# Patient Record
Sex: MALE | Race: WHITE | NOT HISPANIC OR LATINO | Employment: OTHER | URBAN - METROPOLITAN AREA
[De-identification: names, ages, dates, MRNs, and addresses within clinical notes are randomized per-mention and may not be internally consistent; named-entity substitution may affect disease eponyms.]

---

## 2018-05-14 ENCOUNTER — OFFICE VISIT (OUTPATIENT)
Dept: FAMILY MEDICINE | Facility: CLINIC | Age: 38
End: 2018-05-14
Payer: COMMERCIAL

## 2018-05-14 VITALS
BODY MASS INDEX: 28.29 KG/M2 | SYSTOLIC BLOOD PRESSURE: 127 MMHG | HEIGHT: 69 IN | OXYGEN SATURATION: 97 % | RESPIRATION RATE: 20 BRPM | DIASTOLIC BLOOD PRESSURE: 69 MMHG | HEART RATE: 52 BPM | WEIGHT: 191 LBS | TEMPERATURE: 99.3 F

## 2018-05-14 DIAGNOSIS — R06.02 SOB (SHORTNESS OF BREATH): ICD-10-CM

## 2018-05-14 DIAGNOSIS — M70.22 OLECRANON BURSITIS OF LEFT ELBOW: Primary | ICD-10-CM

## 2018-05-14 DIAGNOSIS — R05.9 COUGH: ICD-10-CM

## 2018-05-14 PROCEDURE — 99214 OFFICE O/P EST MOD 30 MIN: CPT | Performed by: NURSE PRACTITIONER

## 2018-05-14 RX ORDER — ALBUTEROL SULFATE 90 UG/1
2 AEROSOL, METERED RESPIRATORY (INHALATION) EVERY 6 HOURS PRN
Qty: 1 INHALER | Refills: 0 | Status: SHIPPED | OUTPATIENT
Start: 2018-05-14 | End: 2019-03-15

## 2018-05-14 NOTE — PROGRESS NOTES
"  SUBJECTIVE:   Meño Daniels is a 37 year old male who presents to clinic today for the following health issues:      Musculoskeletal problem/pain      Duration: 1.5 months     Description  Location: left elbow     Intensity:  moderate    Accompanying signs and symptoms: swelling and fluid filled     History  Previous similar problem: YES  Previous evaluation:  none    Precipitating or alleviating factors:  Trauma or overuse: YES- hit elbow on ice from hockey   Aggravating factors include: continual impact to the site     Therapies tried and outcome: ice    Gets this once every hockey season.  Some resolve independently, some have been drained.      Also c/o SOB and nonproductive cough after he exercises.    Wife noticed every time he comes off the ice when he is playing hockey he coughs for a while.    Started paying attention to this and he notices this happens every time is is active.    Had chronic bronchitis as a kid and used an inhaler.    Mom smoked around him as a child and the inhaler helped.        Reviewed and updated as needed this visit by clinical staff  Tobacco  Allergies  Meds  Problems  Med Hx  Surg Hx  Fam Hx  Soc Hx        Reviewed and updated as needed this visit by Provider  Tobacco  Allergies  Meds  Problems  Med Hx  Surg Hx  Fam Hx  Soc Hx          ROS:  Constitutional, HEENT, cardiovascular, pulmonary, gi and gu systems are negative, except as otherwise noted.    OBJECTIVE:     /69 (BP Location: Left arm, Patient Position: Sitting, Cuff Size: Adult Regular)  Pulse 52  Temp 99.3  F (37.4  C) (Oral)  Resp 20  Ht 5' 9.25\" (1.759 m)  Wt 191 lb (86.6 kg)  SpO2 97%  BMI 28 kg/m2  Body mass index is 28 kg/(m^2).  GENERAL: healthy, alert and no distress  RESP: lungs clear to auscultation - no rales, rhonchi or wheezes  CV: regular rate and rhythm, normal S1 S2, no S3 or S4, no murmur, click or rub, no peripheral edema and peripheral pulses strong  MS: soft flesh toned 2 " cm fluctuant cyst on posterior olecranon  SKIN: no suspicious lesions or rashes      ASSESSMENT/PLAN:       ICD-10-CM    1. Olecranon bursitis of left elbow M70.22 ORTHO  REFERRAL   2. Cough R05 albuterol (PROAIR HFA/PROVENTIL HFA/VENTOLIN HFA) 108 (90 Base) MCG/ACT Inhaler   3. SOB (shortness of breath) R06.02 albuterol (PROAIR HFA/PROVENTIL HFA/VENTOLIN HFA) 108 (90 Base) MCG/ACT Inhaler     Recommend compression.  Given reoccurrence recommend f/u with orthopedics if unimproved.      Trial proair pre exercise.  F/u if persists or worsens.    The pathophysiology of asthma is explained. We've discussed the importance of compliance with medical regimen, and various treatment modalities such as beta agonists and inhaled steroids. The concepts of prophylactic and episodic or 'rescue' therapy has been discussed. The patient indicates understanding of these issues and knows when to call this office for help in treatment of asthma.    See Patient Instructions    EMA Moreira CNP  [facility]

## 2018-05-14 NOTE — MR AVS SNAPSHOT
After Visit Summary   5/14/2018    Meño Daniels    MRN: [id number]           Patient Information     Date Of Birth          1980        Visit Information        Provider Department      5/14/2018 1:00 PM Erendira Gómez APRN CNP [facility]        Today's Diagnoses     Olecranon bursitis of left elbow    -  1       Follow-ups after your visit        Additional Services     ORTHO  REFERRAL       [facility] is referring you to the Orthopedic  Services at [facility].       The  Representative will assist you in the coordination of your Orthopedic and Musculoskeletal Care as prescribed by your physician.    The  Representative will call you within 1 business day to help schedule your appointment, or you may contact the  Representative at:    All areas ~ [phone]     Type of Referral : Non Surgical       Timeframe requested: 3 - 5 days    Coverage of these services is subject to the terms and limitations of your health insurance plan.  Please call member services at your health plan with any benefit or coverage questions.      If X-rays, CT or MRI's have been performed, please contact the facility where they were done to arrange for , prior to your scheduled appointment.  Please bring this referral request to your appointment and present it to your specialist.                  Who to contact     If you have questions or need follow up information about today's clinic visit or your schedule please contact [facility] directly at [phone].  Normal or non-critical lab and imaging results will be communicated to you by MyChart, letter or phone within 4 business days after the clinic has received the results. If you do not hear from us within 7 days, please contact the clinic through MyChart or phone. If you have a critical or abnormal lab result, we will notify  "you by phone as soon as possible.  Submit refill requests through [email or web address] or call your pharmacy and they will forward the refill request to us. Please allow 3 business days for your refill to be completed.          Additional Information About Your Visit        [employer] Information     [email or web address] gives you secure access to your electronic health record. If you see a primary care provider, you can also send messages to your care team and make appointments. If you have questions, please call your primary care clinic.  If you do not have a primary care provider, please call [phone] and they will assist you.        Care EveryWhere ID     This is your Care EveryWhere ID. This could be used by other organizations to access your [facility] medical records  [id number]        Your Vitals Were     Pulse Temperature Respirations Height Pulse Oximetry BMI (Body Mass Index)    52 99.3  F (37.4  C) (Oral) 20 5' 9.25\" (1.759 m) 97% 28 kg/m2       Blood Pressure from Last 3 Encounters:   05/14/18 127/69   02/26/16 104/64   02/23/16 115/80    Weight from Last 3 Encounters:   05/14/18 191 lb (86.6 kg)   02/26/16 183 lb 3.2 oz (83.1 kg)   02/23/16 183 lb (83 kg)              We Performed the Following     ORTHO  REFERRAL        Primary Care Provider Office Phone # Fax #    Erendira Gómez, APRN [phone] [address] [facility] [address]        Equal Access to Services     ZACK DICKERSON : Hadii aad ku hadasho Soomaali, waaxda luqadaha, qaybta kaalmada adeegyada, waxay brandie gonsales . So [facility] [phone].    ATENCIÓN: Si habla español, tiene a rodriguez disposición servicios gratuitos de asistencia lingüística. Llame al [phone].    We comply with applicable federal civil rights laws and [place] laws. We do not discriminate on the basis of race, color, national origin, age, disability, sex, sexual orientation, or gender identity.            Thank you!     Thank you for " choosing [facility]  for your care. Our goal is always to provide you with excellent care. Hearing back from our patients is one way we can continue to improve our services. Please take a few minutes to complete the written survey that you may receive in the mail after your visit with us. Thank you!             Your Updated Medication List - Protect others around you: Learn how to safely use, store and throw away your medicines at www.disposemymeds.org.          This list is accurate as of 5/14/18  1:16 PM.  Always use your most recent med list.                   Brand Name Dispense Instructions for use Diagnosis    cholecalciferol 5000 units Caps     30 capsule         sulfamethoxazole-trimethoprim 800-160 MG per tablet    BACTRIM DS/SEPTRA DS    20 tablet    Take 1 tablet by mouth 2 times daily    Abscess of neck

## 2019-03-15 ENCOUNTER — TELEPHONE (OUTPATIENT)
Dept: FAMILY MEDICINE | Facility: CLINIC | Age: 39
End: 2019-03-15

## 2019-03-15 ENCOUNTER — NURSE TRIAGE (OUTPATIENT)
Dept: NURSING | Facility: CLINIC | Age: 39
End: 2019-03-15

## 2019-03-15 DIAGNOSIS — R06.02 SOB (SHORTNESS OF BREATH): ICD-10-CM

## 2019-03-15 DIAGNOSIS — R05.9 COUGH: ICD-10-CM

## 2019-03-15 DIAGNOSIS — J45.990 EXERCISE-INDUCED ASTHMA: Primary | ICD-10-CM

## 2019-03-15 RX ORDER — ALBUTEROL SULFATE 90 UG/1
2 AEROSOL, METERED RESPIRATORY (INHALATION) EVERY 6 HOURS PRN
Qty: 6.7 G | Refills: 0 | Status: SHIPPED | OUTPATIENT
Start: 2019-03-15 | End: 2019-12-09

## 2019-03-15 NOTE — TELEPHONE ENCOUNTER
Clinic Action Needed:  Yes, refill  FNA Triage Call  Presenting Problem:    Meño is calling to get a refill on Proair HFA/Proventil) Albuterol inhaler.  Please phone Meño with any questions.      Routed to:  RN Pool    Please be sure to close this encounter once this patient's issue/question has been addressed.    Michell Xie RN/[facility]

## 2019-03-15 NOTE — TELEPHONE ENCOUNTER
Meño is calling to get a refill on Proair HFA/Proventil) Albuterol inhaler.  Please phone Meño with any questions.

## 2019-03-15 NOTE — TELEPHONE ENCOUNTER
HP Reception please try to reach patient again today to make an appointment for further refills. Thank you. This would be needed by May.      Medication is being filled for 1 time refill only due to:  needs a yearly check in by 5/14/19.     Silvana Cole RN

## 2019-03-15 NOTE — TELEPHONE ENCOUNTER
Pt in agreement with plan and verbalized understanding. Will call back to schedule an appointment   Thanks!    Emani Garcia

## 2019-03-27 ENCOUNTER — OFFICE VISIT (OUTPATIENT)
Dept: FAMILY MEDICINE | Facility: CLINIC | Age: 39
End: 2019-03-27
Payer: COMMERCIAL

## 2019-03-27 VITALS
HEART RATE: 50 BPM | RESPIRATION RATE: 16 BRPM | WEIGHT: 189 LBS | SYSTOLIC BLOOD PRESSURE: 142 MMHG | OXYGEN SATURATION: 99 % | BODY MASS INDEX: 27.99 KG/M2 | DIASTOLIC BLOOD PRESSURE: 93 MMHG | TEMPERATURE: 97.8 F | HEIGHT: 69 IN

## 2019-03-27 DIAGNOSIS — Z13.220 SCREENING FOR LIPOID DISORDERS: ICD-10-CM

## 2019-03-27 DIAGNOSIS — M79.672 PAIN IN BOTH FEET: ICD-10-CM

## 2019-03-27 DIAGNOSIS — Z23 NEED FOR INFLUENZA VACCINATION: ICD-10-CM

## 2019-03-27 DIAGNOSIS — Z80.0 FAMILY HISTORY OF COLON CANCER: ICD-10-CM

## 2019-03-27 DIAGNOSIS — Z00.00 ROUTINE GENERAL MEDICAL EXAMINATION AT A HEALTH CARE FACILITY: Primary | ICD-10-CM

## 2019-03-27 DIAGNOSIS — M79.671 PAIN IN BOTH FEET: ICD-10-CM

## 2019-03-27 DIAGNOSIS — K64.4 EXTERNAL HEMORRHOIDS: ICD-10-CM

## 2019-03-27 LAB
ALBUMIN SERPL-MCNC: 4.5 G/DL (ref 3.3–4.6)
ALP SERPL-CCNC: 43 U/L (ref 40–150)
ALT SERPL-CCNC: 30 U/L (ref 0–70)
AST SERPL-CCNC: 37 U/L (ref 0–55)
BILIRUB SERPL-MCNC: 1.5 MG/DL (ref 0.2–1.3)
BUN SERPL-MCNC: 14 MG/DL (ref 5–24)
CALCIUM SERPL-MCNC: 9.7 MG/DL (ref 8.5–10.4)
CHLORIDE SERPLBLD-SCNC: 101 MMOL/L (ref 94–109)
CHOLEST SERPL-MCNC: 216 MG/DL (ref 0–200)
CHOLEST/HDLC SERPL: 2.6 {RATIO} (ref 0–5)
CO2 SERPL-SCNC: 34 MMOL/L (ref 20–32)
CREAT SERPL-MCNC: 1 MG/DL (ref 0.8–1.5)
EGFR CALCULATED (BLACK REFERENCE): 107.5
EGFR CALCULATED (NON BLACK REFERENCE): 88.9
FASTING SPECIMEN: NO
GLUCOSE SERPL-MCNC: 96 MG/DL (ref 60–109)
HDLC SERPL-MCNC: 82 MG/DL
LDLC SERPL CALC-MCNC: 120 MG/DL (ref 0–129)
POTASSIUM SERPL-SCNC: 4.5 MMOL/L (ref 3.4–5.3)
PROT SERPL-MCNC: 7.2 G/DL (ref 6.8–8.8)
SODIUM SERPL-SCNC: 144 MMOL/L (ref 136–145)
TRIGL SERPL-MCNC: 70 MG/DL (ref 0–150)
VLDL-CHOLESTEROL: 14 (ref 7–32)

## 2019-03-27 ASSESSMENT — MIFFLIN-ST. JEOR: SCORE: 1771.68

## 2019-03-27 NOTE — PROGRESS NOTES
REASON FOR VISIT: Establish care and annual physical    Meño Daniels is a 38 year old very active and physically fit male. He rides bicycles daily. He presents to [facility] and for an annual physical. He has the following concerns he would like to discuss:    Healthy diet  No soda  No major health problems    1. Blood per rectum and family history of colon cancer  He has been experiencing intermittent blood per rectum for about 10 years. He reports that this often coincides with other symptoms of hemorrhoids, such as rectal pain or itching. He has a family history of hemorrhoids in his father. He also has a family history of colon cancer in his paternal grandfather at age 80. He also notes that he typically has about 2 BMs per day, but often develops diarrhea. When this occurs, he can have up to 15 BMs per day. This makes his rectum raw, which he believes could also be a cause for the bleeding.    2. LEFT heel pain  He developed LEFT heel pain about 6 months ago. He attributes this to running with his dog, which makes him run faster than he normally would (mile in 6 min 40 sec). He stopped running with his dog, and the pain improved slightly but not completely. He reports that his LEFT heel is still very sore in the morning, and the pain improves after 15 minutes of walking around. The pain also recurs if he goes for a run, then sits for some time afterward. He has not done PT.    3. RIGHT toe pain  He was doing yoga during the winter of 2018, and went from a crescent lunge to a deeper crescent lunge. The next day, he woke up with a stabbing pain in his RIGHT first toe. The pain was located at the base of his first toe. The toe was also very unstable. He reports that he had severe pain in the toe for the entire day, which slowly improved. He occasionally still feels pain and instability when he steps on his RIGHT foot, especially while walking on ice. No pain currently.    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 for undergrad    Avid cyclist, bikes to work       FAMILY, MEDICAL AND PAST SURGICAL HISTORIES:  Unchanged.       MEDICATIONS:  Reviewed.       HEALTHCARE MAINTENANCE:    Dental: Due for dental visit  Immunizations: Declines flu vaccine. Up to date on other immunizations.  EtOH: 1/day most days, 2-3/day 2-3 days/week. No drinking in January.  Activity: 5-6 days per week, 1-3 hours. Cycling, XC skiing, hockey, yoga, running  STD screening: No concerns.    Health Maintenance   Topic Date Due     PREVENTIVE CARE VISIT  1980     ASTHMA ACTION PLAN Q1 YR  06/17/1985     ASTHMA CONTROL TEST Q6 MOS  06/17/1985     LIPID SCREEN Q5 YR MALE (SYSTEM ASSIGNED)  06/17/2015     PHQ-2 Q1 YR  03/27/2020     DTAP/TDAP/TD IMMUNIZATION (3 - Td) 03/08/2023     ZOSTER IMMUNIZATION (1 of 2) 06/17/2030     HIV SCREEN (SYSTEM ASSIGNED)  Completed     INFLUENZA VACCINE  Addressed     IPV IMMUNIZATION  Aged Out     MENINGITIS IMMUNIZATION  Aged Out         REVIEW OF SYSTEMS:  POSITIVE for blood per rectum, diarrhea, LEFT heel pain, and RIGHT first toe pain. Otherwise, 10 system review of systems is negative.       OBJECTIVE:    EXAM:  GENERAL: Alert, pleasant, NAD  VITAL SIGNS: BP (!) 142/93 (BP Location: Right arm, Patient Position: Sitting, Cuff Size: Adult Large)   Pulse 50   Temp 97.8  F (36.6  C) (Oral)   Resp 16   Ht 1.759 m (5' 9.25\")   Wt 85.7 kg (189 lb)   SpO2 99%   BMI 27.71 kg/m    EYES: PERRL, EOMI, conjunctiva clear  HENT: TM normal bilaterally. Nose and mouth without lesions.  NECK: No adenopathy, thyroid normal to palpation  RESP: Lungs clear to auscultation bilaterally  Axillae: No palpable axillary masses or adenopathy  CV: Regular rate and rhythm, normal S1 S2, No murmur, no carotid bruits  ABDOMEN: Soft, nontender, without HSM or masses. Bowel sounds normal   and Rectal - Deferred. Patient " had no concerns.   MS: Extremities normal- no gross deformities noted, no tender, hot or swollen joints. Full ROM in hips.  SKIN: No suspicious lesions or rashes  NEURO: Normal strength and tone, sensory exam grossly normal.  PSYCH: Mentation appears normal. Affect normal/bright.  EXT: No peripheral edema      RIGHT popliteal angle on hamstring flexion 150 degrees  LEFT popliteal angle on hamstring flexion about 145 degrees    LEFT foot  Observation: Normal  Palpation: No tenderness now, but at distal calcaneal border with present.  Range of motion:  Achilles flexion to 0 degrees with bent knee, -15 degrees with straight knee. 65 degrees of dorsiflexion.      Right foot  Observation: Some degree of pain (likely arthirtis) at first metatarsal phalangeal joint with limited dorsiflexion of the MTP joint.    Range of motion: Achilles flexion to 0 degrees with bent knee, -15 degrees with straight knee. 25 degrees of dorsiflexion.      LABORATORY DATA: Labs pending      ASSESSMENT:   Meño is a very physically active and fit 37yo who presents for his first visit to the [facility].     His concerns today are:  1. Intermittent hemorrhoids  2. Family history of colon cancer  3. LEFT heel pain that appears to be mild Plantar Fasciitis  3. RIGHT 1st toe pain that appears to likely be DJD of the MTP    On Physical exam, his achilles tendons and hamstrings have limited flexibility    P:  Check baseline labs  Referred to Colorectal surgery  Also, sent to Physical therapy. May just need a few visits  4. Due for dental visit    Follow up in 1 year or sooner as needed.       Call with any problems or questions.       I, Maria M Pineda, am serving as a scribe to document services personally performed by Cody Stephens MD based on data collection and the provider's statements to me. Dr. Stephens has reviewed, edited and approved the above note.     --Cody Stephens MD

## 2019-03-27 NOTE — NURSING NOTE
"38 year old  Chief Complaint   Patient presents with     Physical     Establish Care     Rectal Bleeding     fam hx of colon cancer in [facility]     Foot Pain     heel pain on left and turf toe on right      Abscess     on head       Blood pressure (!) 142/93, pulse 50, temperature 97.8  F (36.6  C), temperature source Oral, resp. rate 16, height 1.759 m (5' 9.25\"), weight 85.7 kg (189 lb), SpO2 99 %. Body mass index is 27.71 kg/m .  Patient Active Problem List   Diagnosis     CARDIOVASCULAR SCREENING; LDL GOAL LESS THAN 160       Wt Readings from Last 2 Encounters:   03/27/19 85.7 kg (189 lb)   05/14/18 86.6 kg (191 lb)     BP Readings from Last 3 Encounters:   03/27/19 (!) 142/93   05/14/18 127/69   02/26/16 104/64         Current Outpatient Medications   Medication     albuterol (PROAIR HFA/PROVENTIL HFA/VENTOLIN HFA) 108 (90 Base) MCG/ACT inhaler     cholecalciferol 5000 UNITS CAPS     sulfamethoxazole-trimethoprim (BACTRIM DS,SEPTRA DS) 800-160 MG per tablet     No current facility-administered medications for this visit.        Social History     Tobacco Use     Smoking status: Never Smoker     Smokeless tobacco: Never Used   Substance Use Topics     Alcohol use: Yes     Comment: social     Drug use: No       Health Maintenance Due   Topic Date Due     PREVENTIVE CARE VISIT  1980     ASTHMA ACTION PLAN Q1 YR  06/17/1985     ASTHMA CONTROL TEST Q6 MOS  06/17/1985     LIPID SCREEN Q5 YR MALE (SYSTEM ASSIGNED)  06/17/2015       No results found for: PAP      March 27, 2019 12:58 PM    "

## 2019-03-27 NOTE — PATIENT INSTRUCTIONS
Meño is a very physically active and fit 39yo who presents for his first visit to the [facility].     His concerns today are:  1. Intermittent hemorrhoids  2. Family history of colon cancer  3. LEFT heel pain that appears to be mild Plantar Fasciitis  3. RIGHT 1st toe pain that appears to likely be DJD of the MTP    On Physical exam, his achilles tendons and hamstrings have limited flexibility    P:  Check baseline labs  Referred to Colorectal surgery  Also, sent to Physical therapy. May just need a few visits  4. Due for dental visit    Follow up in 1 year or sooner as needed.       Preventive Health Recommendations  Male Ages 26 - 39    Yearly exam:             See your health care provider every year in order to  o   Review health changes.   o   Discuss preventive care.    o   Review your medicines if your doctor has prescribed any.    You should be tested each year for STDs (sexually transmitted diseases), if you re at risk.     After age 35, talk to your provider about cholesterol testing. If you are at risk for heart disease, have your cholesterol tested at least every 5 years.     If you are at risk for diabetes, you should have a diabetes test (fasting glucose).  Shots: Get a flu shot each year. Get a tetanus shot every 10 years.     Nutrition:    Eat at least 5 servings of fruits and vegetables daily.     Eat whole-grain bread, whole-wheat pasta and brown rice instead of white grains and rice.     Get adequate Calcium and Vitamin D.     Lifestyle    Exercise for at least 150 minutes a week (30 minutes a day, 5 days a week). This will help you control your weight and prevent disease.     Limit alcohol to one drink per day.     No smoking.     Wear sunscreen to prevent skin cancer.     See your dentist every six months for an exam and cleaning.

## 2019-03-27 NOTE — LETTER
[facility]  [address]  [place] [address]  Phone: [phone]  Fax: [phone]       Date: March 28, 2019      Meño Daniels  [address]   [place] [address]        Hi Meño,  Your labs appear in the healthy range.   Let me know if you have questions,  --Cody Stephens MD    Resulted Orders   Lipid Panel ([facility])   Result Value Ref Range    FASTING SPECIMEN no     Cholesterol 216.0 (H) 0.0 - 200.0    HDL Cholesterol 82.0 >40.0    Triglycerides 70.0 0.0 - 150.0    Cholesterol/HDL Ratio 2.6 0.0 - 5.0    LDL Cholesterol Direct 120.0 0.0 - 129.0    VLDL-Cholesterol 14.0 7.0 - 32.0   Comprehensive Metabolic Panel ([facility])   Result Value Ref Range    Glucose 96.0 60.0 - 109.0 mg/dL    Urea Nitrogen 14.0 5.0 - 24.0 mg/dL    Calcium 9.7 8.5 - 10.4 mg/dL    Creatinine 1.0 0.8 - 1.5 mg/dL    eGFR Calculated (Non Black Reference) 88.9 >60.0    eGFR Calculated (Black Reference) 107.5 >60.0    Sodium 144.0 136.0 - 145.0 mmol/L    Potassium 4.5 3.4 - 5.3 mmol/L    Chloride 101.0 94.0 - 109.0 mmol/L    Carbon Dioxide 34.0 (H) 20.0 - 32.0 mmol/L    Albumin 4.5 3.3 - 4.6 g/dL    Alkaline Phosphatase 43.0 40.0 - 150.0 U/L    ALT 30.0 0.0 - 70.0 U/L    AST 37.0 0.0 - 55.0 U/L    Bilirubin Total 1.5 (H) 0.2 - 1.3 mg/dL    Protein Total 7.2 6.8 - 8.8 g/dL

## 2019-03-29 ASSESSMENT — ASTHMA QUESTIONNAIRES: ACT_TOTALSCORE: 22

## 2019-04-01 ENCOUNTER — THERAPY VISIT (OUTPATIENT)
Dept: PHYSICAL THERAPY | Facility: CLINIC | Age: 39
End: 2019-04-01
Attending: FAMILY MEDICINE
Payer: COMMERCIAL

## 2019-04-01 DIAGNOSIS — M72.2 PLANTAR FASCIITIS OF LEFT FOOT: ICD-10-CM

## 2019-04-01 DIAGNOSIS — M79.671 PAIN IN BOTH FEET: ICD-10-CM

## 2019-04-01 DIAGNOSIS — M79.672 PAIN IN BOTH FEET: ICD-10-CM

## 2019-04-01 DIAGNOSIS — M79.674 PAIN OF TOE OF RIGHT FOOT: ICD-10-CM

## 2019-04-01 PROCEDURE — 97110 THERAPEUTIC EXERCISES: CPT | Mod: GP | Performed by: PHYSICAL THERAPIST

## 2019-04-01 PROCEDURE — 97161 PT EVAL LOW COMPLEX 20 MIN: CPT | Mod: GP | Performed by: PHYSICAL THERAPIST

## 2019-04-01 NOTE — PROGRESS NOTES
[facility] Initial Evaluation  Subjective:    Meño Daniels is a 38 year old male with a bilateral feet condition.  Condition occurred with:  Repetition/overuse.    This is a new condition  9/1/19 L heel during running with dog and yoga, started wearing Crocs and pain has improved with less morning pain. 2/14/19 R toe when stepping out of bed, performed cross country skiing without pain, then pain during lunge position with yoga..    Patient reports pain:  Great toe and medial calcaneal tuberosity.    Quality: L heel is ache, R toe is sharp. and is intermittent Pain Scale: L heel 7/10.   Pain is worse in the A.M. (L heel).  Symptoms are exacerbated by walking and standing (R toe with extension) Relieved by: L heel - supportive shoes, R toe - rest.  Since onset symptoms are gradually improving.        General health as reported by patient is excellent.  Pertinent medical history includes:  Asthma.  Medical allergies: no.  Other surgeries include:  None reported.    Current occupation is .            Red flags:  None as reported by patient.                        Objective:  System    Physical Exam    General     ROS  Gait: Patient demonstrates right antalgic gait with early heel rise and decreased toe off.  Observation:  Swelling/warmth Absent  Double leg stance- unremarkable  Single leg stance- unremarkable  WB dorsiflexion- R ankle = 4.5 inches, L ankle = 5 inches    Joint mobility:  Subtalar- sup/pron - decreased subtalar joint mobility on L>>R   Midtarsals- eversion/inversion, flex/ext, sup/pron - unremarkable    Ankle ROM: equal AROM side to side   Left Right   Dorsiflexion NA deg NA deg   Plantar flexion NA deg NA deg   Inversion NA deg NA deg   Eversion NA deg NA deg     Ankle Strength:   Left Right Pain?   Inversion + DF 5/5 5/5 none   Inversion + PF 5/5 5/5 none   Eversion + DF 5/5 5/5 none   Eversion + PF 5/5 5/5 none     Special Tests:  Anterior drawer: not done  Talar  Tilt: not done  External Rotation: not done    Assessment/Plan:    Patient is a 38 year old male with bilateral foot complaints.    Patient has the following significant findings with corresponding treatment plan.                Diagnosis 1:  L foot pain consistent with plantar fasciitis  Pain -  hot/cold therapy, US, manual therapy, self management, education and home program  Decreased joint mobility - manual therapy, therapeutic exercise and home program  Decreased strength - therapeutic exercise, therapeutic activities and home program  Decreased proprioception - neuro re-education, therapeutic activities and home program  Diagnosis 2:  R great toe pain consistent  Pain -  hot/cold therapy, US, manual therapy, splint/taping/bracing/orthotics, self management, education and home program  Decreased strength - therapeutic exercise, therapeutic activities and home program  Decreased proprioception - neuro re-education, therapeutic activities and home program    Therapy Evaluation Codes:   1) History comprised of:   Personal factors that impact the plan of care:      None.    Comorbidity factors that impact the plan of care are:      Asthma.     Medications impacting care: None.  2) Examination of Body Systems comprised of:   Body structures and functions that impact the plan of care:      Foot and Toe.   Activity limitations that impact the plan of care are:      Walking.  3) Clinical presentation characteristics are:   Stable/Uncomplicated.  4) Decision-Making    Low complexity using standardized patient assessment instrument and/or measureable assessment of functional outcome.  Cumulative Therapy Evaluation is: Low complexity.    Previous and current functional limitations:  (See Goal Flow Sheet for this information)    Short term and Long term goals: (See Goal Flow Sheet for this information)     Communication ability:  Patient appears to be able to clearly communicate and understand verbal and written  communication and follow directions correctly.  Treatment Explanation - The following has been discussed with the patient:   RX ordered/plan of care  Anticipated outcomes  Possible risks and side effects  This patient would benefit from PT intervention to resume normal activities.   Rehab potential is excellent.    Frequency:  1 X week, once daily  Duration:  for 8 weeks  Discharge Plan:  Achieve all LTG.  Independent in home treatment program.  Reach maximal therapeutic benefit.    Please refer to the daily flowsheet for treatment today, total treatment time and time spent performing 1:1 timed codes.

## 2019-04-11 ENCOUNTER — THERAPY VISIT (OUTPATIENT)
Dept: PHYSICAL THERAPY | Facility: CLINIC | Age: 39
End: 2019-04-11
Payer: COMMERCIAL

## 2019-04-11 DIAGNOSIS — M79.674 PAIN OF TOE OF RIGHT FOOT: ICD-10-CM

## 2019-04-11 DIAGNOSIS — M72.2 PLANTAR FASCIITIS OF LEFT FOOT: ICD-10-CM

## 2019-04-11 PROCEDURE — 97140 MANUAL THERAPY 1/> REGIONS: CPT | Mod: GP | Performed by: PHYSICAL THERAPIST

## 2019-04-11 PROCEDURE — 97530 THERAPEUTIC ACTIVITIES: CPT | Mod: GP | Performed by: PHYSICAL THERAPIST

## 2019-04-11 PROCEDURE — 97110 THERAPEUTIC EXERCISES: CPT | Mod: GP | Performed by: PHYSICAL THERAPIST

## 2019-04-18 ENCOUNTER — THERAPY VISIT (OUTPATIENT)
Dept: PHYSICAL THERAPY | Facility: CLINIC | Age: 39
End: 2019-04-18
Payer: COMMERCIAL

## 2019-04-18 DIAGNOSIS — M79.674 PAIN OF TOE OF RIGHT FOOT: ICD-10-CM

## 2019-04-18 DIAGNOSIS — M72.2 PLANTAR FASCIITIS OF LEFT FOOT: ICD-10-CM

## 2019-04-18 PROCEDURE — 97110 THERAPEUTIC EXERCISES: CPT | Mod: GP | Performed by: PHYSICAL THERAPIST

## 2019-04-18 PROCEDURE — 97035 APP MDLTY 1+ULTRASOUND EA 15: CPT | Mod: GP | Performed by: PHYSICAL THERAPIST

## 2019-04-18 PROCEDURE — 97140 MANUAL THERAPY 1/> REGIONS: CPT | Mod: GP | Performed by: PHYSICAL THERAPIST

## 2019-04-22 NOTE — TELEPHONE ENCOUNTER
FUTURE VISIT INFORMATION      FUTURE VISIT INFORMATION:    Date: 5/22/19    Time: 4:45PM    [place] [facility] [place]  REFERRAL INFORMATION:    Referring provider:  Dr. Cody Stephens    Referring providers clinic:  [facility]    Reason for visit/diagnosis:  Hemorrhoids and Family History of Colon Cancer     NOTES STATUS DETAILS   OFFICE NOTE from referring provider  Internal 3/27/19   OFFICE NOTE from other specialist   N/A    DISCHARGE SUMMARY FROM HOSPITAL N/A    DISCHARGE REPORT FROM ED N/A    OPERATIVE REPORT  N/A    PFC REPORT N/A    MEDICATION LIST Internal    LABS     FIT/STOOL TESTING N/A    ANAL PAP N/A    PATHOLOGY REPORTS RELATED TO DIAGNOSIS N/A    DIAGNOSTIC PROCEDURES     COLONOSCOPY N/A    ENDOSCOPY (EGD) N/A    ERCP N/A    ANOSCOPY N/A    FLEX SIGMOIDOSCOPY N/A    IMAGING & REPORT      CT, MRI, US, XR N/A

## 2019-05-08 ENCOUNTER — THERAPY VISIT (OUTPATIENT)
Dept: PHYSICAL THERAPY | Facility: CLINIC | Age: 39
End: 2019-05-08
Payer: COMMERCIAL

## 2019-05-08 DIAGNOSIS — M79.674 PAIN OF TOE OF RIGHT FOOT: ICD-10-CM

## 2019-05-08 DIAGNOSIS — M72.2 PLANTAR FASCIITIS OF LEFT FOOT: ICD-10-CM

## 2019-05-08 PROCEDURE — 97140 MANUAL THERAPY 1/> REGIONS: CPT | Mod: GP | Performed by: PHYSICAL THERAPIST

## 2019-05-08 PROCEDURE — 97530 THERAPEUTIC ACTIVITIES: CPT | Mod: GP | Performed by: PHYSICAL THERAPIST

## 2019-05-21 ENCOUNTER — TELEPHONE (OUTPATIENT)
Dept: SURGERY | Facility: CLINIC | Age: 39
End: 2019-05-21

## 2019-05-21 NOTE — TELEPHONE ENCOUNTER
LM for patient reminding him of his appt with Simran Kaye NP on 5/22/19 @ 4:45pm. Left direct number for pt to call back if unable to make appt.    Donya Damico LPN

## 2019-05-22 ENCOUNTER — OFFICE VISIT (OUTPATIENT)
Dept: SURGERY | Facility: CLINIC | Age: 39
End: 2019-05-22
Attending: FAMILY MEDICINE
Payer: COMMERCIAL

## 2019-05-22 ENCOUNTER — PRE VISIT (OUTPATIENT)
Dept: SURGERY | Facility: CLINIC | Age: 39
End: 2019-05-22

## 2019-05-22 VITALS
HEART RATE: 49 BPM | BODY MASS INDEX: 27.71 KG/M2 | OXYGEN SATURATION: 99 % | DIASTOLIC BLOOD PRESSURE: 82 MMHG | SYSTOLIC BLOOD PRESSURE: 139 MMHG | HEIGHT: 69 IN

## 2019-05-22 DIAGNOSIS — R19.7 DIARRHEA, UNSPECIFIED TYPE: ICD-10-CM

## 2019-05-22 DIAGNOSIS — K62.5 RECTAL BLEEDING: Primary | ICD-10-CM

## 2019-05-22 ASSESSMENT — ENCOUNTER SYMPTOMS
BOWEL INCONTINENCE: 0
ABDOMINAL PAIN: 0
BLOATING: 0
VOMITING: 0
NAUSEA: 0
HEARTBURN: 0
BLOOD IN STOOL: 0
CONSTIPATION: 0
RECTAL PAIN: 0
JAUNDICE: 0
DIARRHEA: 1

## 2019-05-22 ASSESSMENT — PAIN SCALES - GENERAL: PAINLEVEL: NO PAIN (0)

## 2019-05-22 NOTE — PROGRESS NOTES
"Colon and Rectal Surgery Consult Clinic Note    Date: 2019     Referring provider:  Cody Stephens MD  [address]  [place] [address]     RE: Meño Daniels  : 1980  SOFIE: 2019    Meño Daniels is a very pleasant 38 year old male without a significant past medical history with a recent diagnosis of diarrhea and rectal bleeding.  Given these findings they were subsequently sent to the Colon and Rectal Surgery Clinic for an opinion on this and a new patient consultation.     Meño reports long-standing history of intermittent diarrhea, rectal bleeding, and urgency with bowel movements.  He reports that he has diarrhea about 50% of the time.  On a bad day he will have 10-15 bowel movements a day with significant urgency.  He really only notices bleeding when he wipes multiple times after bowel movements and feels raw.  He believes the bleeding is only with wiping he has not seen a drip into the toilet bowl except maybe on one occasion a long time ago.  He is not on any blood thinners.  He denies any abdominal pain but does have increased flatus.  Family history significant for paternal grandfather with colon cancer in his late 70s.  Paternal grandmother with a bone cancer and his mother has a liver cancer that has metastasized to her bones.    Physical Examination: Exam was chaperoned by TIMOTHY Bragg  /82 (BP Location: Right arm, Patient Position: Sitting, Cuff Size: Adult Regular)   Pulse (!) 49   Ht 5' 9.25\"   SpO2 99%   BMI 27.71 kg/m    General: Alert, oriented, in no acute distress, sitting comfortably  HEENT: Mucous membranes moist  Perianal external examination:  Perianal skin: Intact with no excoriation or lichenification.  Lesions: No evidence of an external lesion, nodularity, or induration in the perianal region.  Eversion of buttocks: There was not evidence of an anal fissure. Details: N/A.  Skin tags or external hemorrhoids: " None.  Digital rectal examination: Was performed.   Sphincter tone: Good.  Palpable lesions: No.  Prostate: Normal.  Other: None.    Anoscopy: Was performed.   Hemorrhoids: Yes. Grade 1-2 internal hemorrhoids without active bleeding  Lesions: No    Assessment/Plan: 38 year old male with long-standing diarrhea and intermittent rectal bleeding.  No significant hemorrhoids on exam.  Discussed the bleeding could be related to small hemorrhoids or possibly perianal irritation with frequent diarrhea.  However, given the long-standing nature of his bleeding in addition to the diarrhea, would recommend a colonoscopy at this time.  We will then have him follow-up with gastroenterology for his diarrhea, if colonoscopy is normal.  In the meantime, recommend he try starting a daily fiber supplement such as Metamucil or Citrucel to bulk up his stools and see if this improves his stool consistency.  Encouraged him to contact the clinic in the meantime with any questions or concerns. Patient's questions were answered to his stated satisfaction and he is in agreement with this plan.    Medical history:  Past Medical History:   Diagnosis Date     NO ACTIVE PROBLEMS        Surgical history:  Past Surgical History:   Procedure Laterality Date     NO HISTORY OF SURGERY         Problem list:  Patient Active Problem List    Diagnosis Date Noted     CARDIOVASCULAR SCREENING; LDL GOAL LESS THAN 160 12/04/2014     Priority: Medium       Medications:  Current Outpatient Medications   Medication Sig Dispense Refill     albuterol (PROAIR HFA/PROVENTIL HFA/VENTOLIN HFA) 108 (90 Base) MCG/ACT inhaler Inhale 2 puffs into the lungs every 6 hours as needed for shortness of breath / dyspnea or wheezing 6.7 g 0     methylcellulose (CITRUCEL) powder Start with 1 heaping tablespoon. Increase as needed, 1 heaping tablespoon at a time, up to 3 times per day. 479 g 3     cholecalciferol 5000 UNITS CAPS  30 capsule        Allergies:  No Known  "Allergies    Family history:  Family History   Problem Relation Age of Onset     Colon Cancer Paternal Grandfather        Social history:  Social History     Tobacco Use     Smoking status: Never Smoker     Smokeless tobacco: Never Used   Substance Use Topics     Alcohol use: Yes     Comment: social    Marital status: single.  Occupation:  at [employer]     Nursing Notes:   Clive Luevano EMT  5/22/2019  4:41 PM  Signed  Chief Complaint   Patient presents with     Rectal Problem     Hemorrhoids and famly history of colon cancer.       Vitals:    05/22/19 1639   BP: 139/82   BP Location: Right arm   Patient Position: Sitting   Cuff Size: Adult Regular   Pulse: (!) 49   SpO2: 99%   Height: 5' 9.25\"       Body mass index is 27.71 kg/m .      TIMOTHY Bragg                       Total face to face time was 30 minutes, >50% counseling.    EMA Killian, NP-C  Colon and Rectal Surgery   [facility]    This note was created using speech recognition software and may contain unintended word substitutions.    "

## 2019-05-22 NOTE — LETTER
"2019       RE: Meño Daniels  [address]  [facility] [address]     Dear Colleague,    Thank you for referring your patient, Meño Daniels, to the [facility] COLON AND RECTAL SURGERY at [facility]. Please see a copy of my visit note below.    Colon and Rectal Surgery Consult Clinic Note    Date: 2019     Referring provider:  Cody Stephens MD  [address] [place] [address]     RE: Meño Daniels  : 1980  SOFIE: 2019    Meño Daniels is a very pleasant 38 year old male without a significant past medical history with a recent diagnosis of diarrhea and rectal bleeding.  Given these findings they were subsequently sent to the Colon and Rectal Surgery Clinic for an opinion on this and a new patient consultation.     Meño reports long-standing history of intermittent diarrhea, rectal bleeding, and urgency with bowel movements.  He reports that he has diarrhea about 50% of the time.  On a bad day he will have 10-15 bowel movements a day with significant urgency.  He really only notices bleeding when he wipes multiple times after bowel movements and feels raw.  He believes the bleeding is only with wiping he has not seen a drip into the toilet bowl except maybe on one occasion a long time ago.  He is not on any blood thinners.  He denies any abdominal pain but does have increased flatus.  Family history significant for paternal grandfather with colon cancer in his late 70s.  Paternal grandmother with a bone cancer and his mother has a liver cancer that has metastasized to her bones.    Physical Examination: Exam was chaperoned by Clive Luevano, EMT  /82 (BP Location: Right arm, Patient Position: Sitting, Cuff Size: Adult Regular)   Pulse (!) 49   Ht 5' 9.25\"   SpO2 99%   BMI 27.71 kg/m     General: Alert, oriented, in no acute distress, sitting comfortably  HEENT: Mucous membranes moist  Perianal " external examination:  Perianal skin: Intact with no excoriation or lichenification.  Lesions: No evidence of an external lesion, nodularity, or induration in the perianal region.  Eversion of buttocks: There was not evidence of an anal fissure. Details: N/A.  Skin tags or external hemorrhoids: None.  Digital rectal examination: Was performed.   Sphincter tone: Good.  Palpable lesions: No.  Prostate: Normal.  Other: None.    Anoscopy: Was performed.   Hemorrhoids: Yes. Grade 1-2 internal hemorrhoids without active bleeding  Lesions: No    Assessment/Plan: 38 year old male with long-standing diarrhea and intermittent rectal bleeding.  No significant hemorrhoids on exam.  Discussed the bleeding could be related to small hemorrhoids or possibly perianal irritation with frequent diarrhea.  However, given the long-standing nature of his bleeding in addition to the diarrhea, would recommend a colonoscopy at this time.  We will then have him follow-up with gastroenterology for his diarrhea, if colonoscopy is normal.  In the meantime, recommend he try starting a daily fiber supplement such as Metamucil or Citrucel to bulk up his stools and see if this improves his stool consistency.  Encouraged him to contact the clinic in the meantime with any questions or concerns. Patient's questions were answered to his stated satisfaction and he is in agreement with this plan.    Medical history:  Past Medical History:   Diagnosis Date     NO ACTIVE PROBLEMS        Surgical history:  Past Surgical History:   Procedure Laterality Date     NO HISTORY OF SURGERY         Problem list:  Patient Active Problem List    Diagnosis Date Noted     CARDIOVASCULAR SCREENING; LDL GOAL LESS THAN 160 12/04/2014     Priority: Medium       Medications:  Current Outpatient Medications   Medication Sig Dispense Refill     albuterol (PROAIR HFA/PROVENTIL HFA/VENTOLIN HFA) 108 (90 Base) MCG/ACT inhaler Inhale 2 puffs into the lungs every 6 hours as needed  "for shortness of breath / dyspnea or wheezing 6.7 g 0     methylcellulose (CITRUCEL) powder Start with 1 heaping tablespoon. Increase as needed, 1 heaping tablespoon at a time, up to 3 times per day. 479 g 3     cholecalciferol 5000 UNITS CAPS  30 capsule        Allergies:  No Known Allergies    Family history:  Family History   Problem Relation Age of Onset     Colon Cancer Paternal Grandfather        Social history:  Social History     Tobacco Use     Smoking status: Never Smoker     Smokeless tobacco: Never Used   Substance Use Topics     Alcohol use: Yes     Comment: social    Marital status: single.  Occupation:  at [employer]     Nursing Notes:   Clive Luevano EMT  5/22/2019  4:41 PM  Signed  Chief Complaint   Patient presents with     Rectal Problem     Hemorrhoids and famly history of colon cancer.       Vitals:    05/22/19 1639   BP: 139/82   BP Location: Right arm   Patient Position: Sitting   Cuff Size: Adult Regular   Pulse: (!) 49   SpO2: 99%   Height: 5' 9.25\"       Body mass index is 27.71 kg/m .      TIMOTHY Bragg        Total face to face time was 30 minutes, >50% counseling.    EMA Killian, NP-C  Colon and Rectal Surgery   [facility]    This note was created using speech recognition software and may contain unintended word substitutions.      "

## 2019-05-22 NOTE — NURSING NOTE
"Chief Complaint   Patient presents with     Rectal Problem     Hemorrhoids and famly history of colon cancer.       Vitals:    05/22/19 1639   BP: 139/82   BP Location: Right arm   Patient Position: Sitting   Cuff Size: Adult Regular   Pulse: (!) 49   SpO2: 99%   Height: 5' 9.25\"       Body mass index is 27.71 kg/m .      Clive Luevano, EMT                      "

## 2019-05-23 DIAGNOSIS — K62.5 RECTAL BLEEDING: Primary | ICD-10-CM

## 2019-05-29 ENCOUNTER — TELEPHONE (OUTPATIENT)
Dept: SURGERY | Facility: CLINIC | Age: 39
End: 2019-05-29

## 2019-05-31 ENCOUNTER — HOSPITAL ENCOUNTER (OUTPATIENT)
Facility: CLINIC | Age: 39
End: 2019-05-31
Attending: COLON & RECTAL SURGERY | Admitting: COLON & RECTAL SURGERY
Payer: COMMERCIAL

## 2019-05-31 NOTE — TELEPHONE ENCOUNTER
Patient is scheduled for surgery with Dr. Hackett      Spoke or left message with: Meño    Date of Surgery: 7/9/2019    Location: [place]    Informed patient they will need an adult  Yes    H&P: Scheduled with PAC    Surgery packet: Will mail

## 2019-06-10 ENCOUNTER — PRE VISIT (OUTPATIENT)
Dept: SURGERY | Facility: CLINIC | Age: 39
End: 2019-06-10

## 2019-06-10 NOTE — TELEPHONE ENCOUNTER
FUTURE VISIT INFORMATION      SURGERY INFORMATION:    Date: 19    Location: [facility] OR    Surgeon:  Stephan Hackett    Anesthesia Type:  MAC    RECORDS REQUESTED FROM:       Primary Care [facility]    Most recent EKG+ [id number]

## 2019-06-19 ENCOUNTER — ANESTHESIA EVENT (OUTPATIENT)
Dept: SURGERY | Facility: CLINIC | Age: 39
End: 2019-06-19

## 2019-06-19 ENCOUNTER — OFFICE VISIT (OUTPATIENT)
Dept: SURGERY | Facility: CLINIC | Age: 39
End: 2019-06-19
Payer: COMMERCIAL

## 2019-06-19 VITALS
HEART RATE: 52 BPM | DIASTOLIC BLOOD PRESSURE: 79 MMHG | HEIGHT: 70 IN | TEMPERATURE: 97.6 F | OXYGEN SATURATION: 98 % | WEIGHT: 185.9 LBS | SYSTOLIC BLOOD PRESSURE: 122 MMHG | RESPIRATION RATE: 14 BRPM | BODY MASS INDEX: 26.61 KG/M2

## 2019-06-19 DIAGNOSIS — Z01.818 PRE-OP EXAMINATION: Primary | ICD-10-CM

## 2019-06-19 ASSESSMENT — MIFFLIN-ST. JEOR: SCORE: 1764.49

## 2019-06-19 NOTE — H&P
Pre-Operative H & P     CC:  Preoperative exam to assess for increased cardiopulmonary risk while undergoing surgery and anesthesia.    Date of Encounter: 6/19/2019  Primary Care Physician:  Erendira Gómez     Reason for visit: pre operative examination, rectal bleeding    HPI  Meño Daniels is a 39 year old male who presents for pre-operative H & P in preparation for examination under anesthesia, colonoscopy with Dr. Hackett on 7/9/19 at [facility].     The patient is a 38 year old man who was seen for a routine health exam and discussed having rectal bleeding and diarrhea. He reports having diarrhea 50% of the time and can have up to 15 bowel movements with urgency. The patient sees bright red blood when he wipes multiple times and feels raw afterwards. Since being seen by the colorectal department he does report a decrease in the bleeding. He does have a family history of colon cancer in his paternal grandfather, paternal grandmother with bone cancer and his mother with liver cancer with metastasis to her bones. Given these symptoms and family history he is now scheduled for the procedure as above.     History is obtained from the patient and chart review    Past Medical History  Past Medical History:   Diagnosis Date     Asthma      Diarrhea      Plantar fasciitis      Rectal bleeding        Past Surgical History  Past Surgical History:   Procedure Laterality Date     wisdom teeth         Hx of Blood transfusions/reactions: none     Hx of abnormal bleeding or anti-platelet use: none    Menstrual history: No LMP for male patient.:     Steroid use in the last year: none    Personal or FH with difficulty with Anesthesia:  none    Prior to Admission Medications  Current Outpatient Medications   Medication Sig Dispense Refill     albuterol (PROAIR HFA/PROVENTIL HFA/VENTOLIN HFA) 108 (90 Base) MCG/ACT inhaler Inhale 2 puffs into the lungs every 6 hours as needed  for shortness of breath / dyspnea or wheezing (Patient taking differently: Inhale 2 puffs into the lungs as needed for shortness of breath / dyspnea or wheezing ) 6.7 g 0       Allergies  No Known Allergies    Social History  Social History     Socioeconomic History     Marital status: Single     Spouse name: Not on file     Number of children: 0     Years of education: Not on file     Highest education level: Not on file   Occupational History     Occupation: [employer]     Comment:    Social Needs     Financial resource strain: Not on file     Food insecurity:     Worry: Not on file     Inability: Not on file     Transportation needs:     Medical: Not on file     Non-medical: Not on file   Tobacco Use     Smoking status: Never Smoker     Smokeless tobacco: Never Used   Substance and Sexual Activity     Alcohol use: Yes     Comment: social     Drug use: No     Sexual activity: Yes     Partners: Female   Lifestyle     Physical activity:     Days per week: Not on file     Minutes per session: Not on file     Stress: Not on file   Relationships     Social connections:     Talks on phone: Not on file     Gets together: Not on file     Attends Ep[employer] service: Not on file     Active member of club or organization: Not on file     Attends meetings of clubs or organizations: Not on file     Relationship status: Not on file     Intimate partner violence:     Fear of current or ex partner: Not on file     Emotionally abused: Not on file     Physically abused: Not on file     Forced sexual activity: Not on file   Other Topics Concern     Parent/sibling w/ CABG, MI or angioplasty before 65F 55M? 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 .    Neurologic:  - neg neurologic ROS     Cardiovascular:  - neg cardiovascular ROS   (+) ----. : . . . :. . Previous cardiac testing date:results:date: results:ECG reviewed date:7/30/12 results:SB date: results:          METS/Exercise Tolerance: Comment: Patient bikes 5 times a week and at most up to 8 hours a time.  >4 METS   Hematologic:  - neg hematologic  ROS       Musculoskeletal:   (+)  other musculoskeletal- plantar fasciitis       GI/Hepatic:     (+) Other GI/Hepatic rectal bleeding, diarrhea       Renal/Genitourinary:  - ROS Renal section negative       Endo:  - neg endo ROS       Psychiatric:  - neg psychiatric ROS       Infectious Disease:  - neg infectious disease ROS       Malignancy:      - no malignancy   Other:    (+) no H/O Chronic Pain,no other significant disability        The complete review of systems is negative other than noted in the HPI or here.   Temp: 97.6  F (36.4  C) Temp src: Oral BP: 122/79 Pulse: 52   Resp: 14 SpO2: 98 %         185 lbs 14.4 oz  5' 10\"   Body mass index is 26.67 kg/m .       Physical Exam  Constitutional: Awake, alert, cooperative, no apparent distress, and appears stated age.  Eyes: Pupils equal, round and reactive to light, extra ocular muscles intact, sclera clear, conjunctiva normal.  HENT: Normocephalic, oral pharynx with moist mucus membranes, good dentition. No goiter appreciated.   Respiratory: Clear to auscultation bilaterally, no crackles or wheezing.  Cardiovascular: Regular rate and rhythm, normal S1 and S2, and no murmur noted.  Carotids +2, no bruits. No edema. Palpable pulses to radial  DP and PT arteries.   GI: Normal bowel sounds, soft, non-distended, non-tender, no masses palpated, no hepatosplenomegaly.    Lymph/Hematologic: No cervical " lymphadenopathy and no supraclavicular lymphadenopathy.  Genitourinary:  defer  Skin: Warm and dry.  No rashes at anticipated surgical site.   Musculoskeletal: Full ROM of neck. There is no redness, warmth, or swelling of the joints. Gross motor strength is normal.    Neurologic: Awake, alert, oriented to name, place and time. Cranial nerves II-XII are grossly intact. Gait is normal.   Neuropsychiatric: Calm, cooperative. Normal affect.     Labs: (personally reviewed)  Results for EKTA BETTENCOURT (MRN [id number]) as of 2019 07:49   Ref. Range 3/27/2019 13:43   Sodium Latest Ref Range: 136.0 - 145.0 mmol/L 144.0   Potassium Latest Ref Range: 3.4 - 5.3 mmol/L 4.5   Chloride Latest Ref Range: 94.0 - 109.0 mmol/L 101.0   Carbon Dioxide Latest Ref Range: 20.0 - 32.0 mmol/L 34.0 (H)   Urea Nitrogen Latest Ref Range: 5.0 - 24.0 mg/dL 14.0   Creatinine Latest Ref Range: 0.8 - 1.5 mg/dL 1.0   eGFR Calculated (Black Reference) Latest Ref Range: >60.0  107.5   eGFR Calculated (Non Black Reference) Latest Ref Range: >60.0  88.9   Calcium Latest Ref Range: 8.5 - 10.4 mg/dL 9.7   Albumin Latest Ref Range: 3.3 - 4.6 g/dL 4.5   Protein Total Latest Ref Range: 6.8 - 8.8 g/dL 7.2   Bilirubin Total Latest Ref Range: 0.2 - 1.3 mg/dL 1.5 (H)   Alkaline Phosphatase Latest Ref Range: 40.0 - 150.0 U/L 43.0   ALT Latest Ref Range: 0.0 - 70.0 U/L 30.0   AST Latest Ref Range: 0.0 - 55.0 U/L 37.0   Cholesterol Latest Ref Range: 0.0 - 200.0  216.0 (H)   Cholesterol/HDL Ratio Latest Ref Range: 0.0 - 5.0  2.6   Fasting Specimen Unknown no   HDL Cholesterol Latest Ref Range: >40.0  82.0   LDL Cholesterol Direct Latest Ref Range: 0.0 - 129.0  120.0   Triglycerides Latest Ref Range: 0.0 - 150.0  70.0   VLDL-Cholesterol Latest Ref Range: 7.0 - 32.0  14.0   Glucose Latest Ref Range: 60.0 - 109.0 mg/dL 96.0     EK12  Sinus bradycardia    The patient's records and results personally reviewed by this provider.     Outside records reviewed  from: care everywhere    ASSESSMENT and PLAN  Meño is a 39 year old man who is scheduled for examination under anesthesia and colonoscopy on 7/9/19 by Dr. Hackett in treatment of rectal bleeding.  PAC referral for risk assessment and optimization for anesthesia with comorbid conditions of asthma, diarrhea, plantar fasciitis:    Pre-operative considerations:  1.  Cardiac:  Functional status- METS >4.  Patient bikes 5 times a week. Low risk surgery with 0.9% (RCRI #) risk of major adverse cardiac event. No cardiac symptoms and excellent exercise tolerance. No further cardiac testing indicated.   2.  Pulm:  Airway feasible.  CRISTI risk: Low  ~ Exersize induced asthma - albuterol PRN  3. GI:  Risk of PONV score = 1.  If > 2, anti-emetic intervention recommended.  ~ Diarrhea/rectal bleeding/urgency - Patient reports the bleeding has improved but stool consistency stays the same. Procedure as above.   4. Musculoskeletal: Plantar fasciitis in left foot - ongoing physical therapy    VTE risk: 0.5%    Patient was discussed with Dr Isaacs.    The patient is optimized for their procedure. AVS with information on surgery time/arrival time, meds and NPO status given by nursing staff.        Radha Vitale PA-C  Preoperative Assessment Center  [facility]  Clinic and Surgery Center  Phone: [phone]  Fax: [phone]

## 2019-06-19 NOTE — ANESTHESIA PREPROCEDURE EVALUATION
Anesthesia Pre-Procedure Evaluation    Patient: Meño Daniels   MRN:     [id number] Gender:   male   Age:    39 year old :      1980        Preoperative Diagnosis: * No surgery found *        Past Medical History:   Diagnosis Date     Asthma      Diarrhea      Rectal bleeding       Past Surgical History:   Procedure Laterality Date     NO HISTORY OF SURGERY            Anesthesia Evaluation     . Pt has had prior anesthetic. Type of anesthetic: wisdom teeth.    No history of anesthetic complications          ROS/MED HX    ENT/Pulmonary:     (+)Intermittent asthma Treatment: Inhaler prn,  , . .    Neurologic:  - neg neurologic ROS     Cardiovascular:  - neg cardiovascular ROS   (+) ----. : . . . :. . Previous cardiac testing date:results:date: results:ECG reviewed date:12 results:SB date: results:          METS/Exercise Tolerance: Comment: Patient bikes 5 times a week and at most up to 8 hours a time.  >4 METS   Hematologic:  - neg hematologic  ROS       Musculoskeletal:   (+)  other musculoskeletal- plantar fasciitis       GI/Hepatic:     (+) Other GI/Hepatic rectal bleeding, diarrhea       Renal/Genitourinary:  - ROS Renal section negative       Endo:  - neg endo ROS       Psychiatric:  - neg psychiatric ROS       Infectious Disease:  - neg infectious disease ROS       Malignancy:      - no malignancy   Other:    (+) no H/O Chronic Pain,no other significant disability                        PHYSICAL EXAM:   Mental Status/Neuro: A/A/O; Age Appropriate   Airway: Facies: Feasible  Mallampati: I  Mouth/Opening: Full  TM distance: > 6 cm  Neck ROM: Full   Respiratory: Auscultation: CTAB     Resp. Rate: Normal     Resp. Effort: Normal      CV: Rhythm: Regular  Heart: Normal Sounds  Pulses: Normal   Comments:                    Lab Results   Component Value Date    WBC 8.7 2012    HGB 15.3 2012    HCT 45.7 2012     2012    .0 2019    POTASSIUM 4.5 2019     "CHLORIDE 101.0 03/27/2019    CO2 34.0 (H) 03/27/2019    BUN 14.0 03/27/2019    CR 1.0 03/27/2019    GLC 96.0 03/27/2019    EULA 9.7 03/27/2019    ALBUMIN 4.5 03/27/2019    PROTTOTAL 7.2 03/27/2019    ALT 30.0 03/27/2019    AST 37.0 03/27/2019    ALKPHOS 43.0 03/27/2019    BILITOTAL 1.5 (H) 03/27/2019    TSH 2.38 07/30/2012       Preop Vitals  BP Readings from Last 3 Encounters:   05/22/19 139/82   03/27/19 (!) 142/93   05/14/18 127/69    Pulse Readings from Last 3 Encounters:   05/22/19 (!) 49   03/27/19 50   05/14/18 52      Resp Readings from Last 3 Encounters:   03/27/19 16   05/14/18 20   02/26/16 18    SpO2 Readings from Last 3 Encounters:   05/22/19 99%   03/27/19 99%   05/14/18 97%      Temp Readings from Last 1 Encounters:   03/27/19 97.8  F (36.6  C) (Oral)    Ht Readings from Last 1 Encounters:   05/22/19 1.759 m (5' 9.25\")      Wt Readings from Last 1 Encounters:   03/27/19 85.7 kg (189 lb)    Estimated body mass index is 27.71 kg/m  as calculated from the following:    Height as of 5/22/19: 1.759 m (5' 9.25\").    Weight as of 3/27/19: 85.7 kg (189 lb).     LDA:            JSAVANNAHG FV AN PLAN NO PONV RULE         PAC Discussion and Assessment    ASA Classification: 2  Case is suitable for: [place]  Anesthetic techniques and relevant risks discussed: MAC with GA as backup  Invasive monitoring and risk discussed:   Types:   Possibility and Risk of blood transfusion discussed:   NPO instructions given:   Additional anesthetic preparation and risks discussed:   Needs early admission to pre-op area:   Other:     PAC Resident/NP Anesthesia Assessment:  Meño is a 39 year old man who is scheduled for examination under anesthesia and colonoscopy on 7/9/19 by Dr. Hackett in treatment of rectal bleeding.  PAC referral for risk assessment and optimization for anesthesia with comorbid conditions of asthma, diarrhea, plantar fasciitis:    Pre-operative considerations:  1.  Cardiac:  Functional status- METS >4.  Patient " bikes 5 times a week. Low risk surgery with 0.9% (RCRI #) risk of major adverse cardiac event. No cardiac symptoms and excellent exercise tolerance. No further cardiac testing indicated.   2.  Pulm:  Airway feasible.  CRISTI risk: Low  ~ Exersize induced asthma - albuterol PRN  3. GI:  Risk of PONV score = 1.  If > 2, anti-emetic intervention recommended.  ~ Diarrhea/rectal bleeding/urgency - Patient reports the bleeding has improved but stool consistency stays the same. Procedure as above.   4. Musculoskeletal: Plantar fasciitis in left foot - ongoing physical therapy    VTE risk: 0.5%    Patient is optimized and is acceptable candidate for the proposed procedure.  No further diagnostic evaluation is needed.     Patient discussed with Dr. Isaacs    For further details of assessment, testing, and physical exam please see H and P completed on same date.    Radha Vitale PA-C        Mid-Level Provider/Resident: Radha Vitale PA-C  Date: 6/19/19  Time:     Attending Anesthesiologist Anesthesia Assessment:        Anesthesiologist:   Date:   Time:   Pass/Fail:   Disposition:     PAC Pharmacist Assessment:        Pharmacist:   Date:   Time:        Radha Vtiale PA-C

## 2019-06-19 NOTE — PATIENT INSTRUCTIONS
Preparing for Your Surgery      Name:  Meño Daniels   MRN:  [id number]   :  1980   Today's Date:  2019     Arriving for surgery:  Surgery date:  2019  Arrival time:  9:00 am   Please come to:       [facility] [address]  [address] [place]  [address]    - ? parking is available in front of the hospital      -    Please proceed to [address] on the 3rd floor. [phone]?     - ?If you are in need of directions, wheelchair or escort please stop at the Information Desk in the lobby.  Inform the information person that you are here for surgery; a wheelchair and escort to [address] will be provided.?     What can I eat or drink?  - Follow directions for bowel prep from Dr Hackett's office       - you may have clear liquids until your arrival time at 9 am     Which medicines can I take?        Stop Aspirin, vitamins and supplements one week prior to surgery.      Hold Ibuprofen for 24 hours and/or Naproxen for 48 hours prior to surgery.     -  Please take these medications the day of surgery:     Inhalers as usual and bring to hospital       How do I prepare myself?  -  Take two showers: one the night before surgery; and one the morning of surgery.         Use Scrubcare or Hibiclens to wash from neck down.  You may use your own shampoo and conditioner. No other hair products.   -  Do NOT use lotion, powder, deodorant, or antiperspirant the day of your surgery.  -  Do NOT wear any makeup, fingernail polish or jewelry.  - Do not bring your own medications to the hospital, except for inhalers and eye   drops.  -  Bring your ID and insurance card.    Questions or Concerns:  -If you have questions or concerns regarding the day of surgery, please call   The Pre Admission Nursing Office at [phone].       -For questions after surgery please call your surgeons office.

## 2019-06-24 NOTE — DISCHARGE INSTRUCTIONS
Anorectal Surgery Instructions    What can I expect after anorectal surgery?  Most anorectal procedures are done as outpatient surgery, and you go home the same day as the procedure. A few surgical procedures will require that you stay in the hospital for about one to three days. No matter where the procedure is done or how long or short it takes, these recommendations will help you heal and feel more comfortable.    Medicines:  The anal area is very sensitive; you can expect to have some pain for up to 2-4 weeks after the procedure. Your doctor will give you a prescription for one or more pain medications.    Take naprosyn 500 mg twice a day OR ibuprofen 600 mg four times a day     Take this on a regular basis (not as needed) following your surgery.     The drugs are best taken with food.  Do not take if it causes stomach upset or if you have a history of ulcers or gastritis. You can stop the naprosyn (or ibuprofen) or reduce the dose when you are feeling better.    DO NOT use naprosyn, ibuprofen, or other similar agents (eg. Advil or Aleve) if you have inflammatory bowel disease (Ulcerative Colitis or Crohn's disease) or if your doctor as advised you against using these medications    Take acetominaphen (Tylenol) 650-1000 mg four times a day.     Take this on a regular basis (not as needed) following surgery for pain control.     Take the lower dose if you are >65 years old or have liver disease. The maximum dose of acetominaphen is 4000 mg a day. You can stop the acetaminophen or reduce the dose when you are feeling better.    It is important to realize that many narcotic pain relievers (including vicodin, percocet, tylenol #3) also have acetaminophen, and excessive doses of acetaminophen can be dangerous, so do not take these in addition to acetominaphen.  You may take narcotics that don't contain acetominaphen such as oxycodone.      Take oxycodone AS NEEDED in addition to the acetominaphen and naprosyn.       Because narcotics have side effects (including constipation), you should reduce your use of these medications as tolerated as your pain improves.    *In general, the best strategy is to take (if you are able to tolerate it) the tylenol and naprosen on a regular basis until your pain has largely gone away. You can take the narcotic pain medicine as needed in addition to the tylenol and ibuprofen. As your pain begins to lessen, you should cut back on your narcotic use while continuing to take your regular tylenol and naprosyn doses.      Refilling prescriptions. If you need additional pain medication, please call the triage nurse at [phone] during normal business hours (8 a.m. to 4 p.m., Monday though Friday) or have your pharmacy fax a refill request to [phone]. If you call after hours or on the weekends, the doctor on call may not know you personally and may not renew narcotic pain medication by phone. Call your primary care provider for all other medication refills.    Perineal care:  External gauze dressing can be removed the morning after surgery. If you have an adhesive dressing stuck to the incision, DO NOT remove this.   Tub baths:    If possible, take a tub bath immediately after each bowel movement.     Baths should be take at least 3 times daily for the first week to 10 days following your procedure. You should soak in the tub for 10 to 15 minutes each time with water as warm as you can tolerate.     Even after you go back to work, it is a good idea to sit in the tub in the morning, after returning from work, and again in the evening before bedtime.    Bleeding/Infection:    You can expect to have some bleeding after bowel movements, but it should stop soon after you wipe.     Use a wet cloth or perianal pad (Tucks or Preparation H pads) to gently wipe the area after each bowel movement.    Do not rub the anal area or use a lot of pressure.    Using a spray bottle filled with warm water helps  loosen any remaining stool. Blot gently with a soft dry cloth or tissue paper.    Infection around the anal opening is not very common. The anal area has excellent blood supply, which helps the area to heal. Bloody discharge after bowel movements is normal and may last 2 to 4 weeks after your surgery. However, if you bleed between bowel movements and cannot get it to stop, call the triage nurse immediately [phone].    Bowel function:  Take a fiber supplement such as Metamucil, which is over the counter. It is important to drink six to eight glasses of water or juice everyday when using fiber products.    If you do not have a bowel movement after 1-2 days:    Take Milk of Magnesia-2 tablespoons.       If there are no results, repeat this or add over the counter Miralax.      If you still do not have results, contact the clinic.     If there are no results, repeat this. Stop taking Milk of Magnesia or other laxatives if you begin to have diarrhea.    * Constipation will cause you to strain when you have a bowel movement. The hard stool will be difficult to pass, will increase pain and bleeding, and will slow down healing.  Try to avoid constipation and/or diarrhea as this can make the pain and bleeding worse.    * It is important to have regular bowel movements at least every other day and to keep your stool soft.  A high fiber diet, including at least four servings of fruits or vegetables daily, will help to keep your bowel movements regular and soft.    Activity:  After your procedure, there are no restrictions on your activity     except restrictions surrounding being on narcotics and in pain, such as no heavy machine operating or driving.     You may walk, climb stairs, ride in a car, and sit as tolerated.     It is helpful to avoid sitting in one position for long periods (2 or more hours).    After some surgeries, you may be told not to perform any lifting (more than 10 pounds) for several weeks after  surgery.    When to call:  When do I need to call the doctor or triage nurse?    If you experience any of the problems listed here, call our triage nurse during business hours ([phone]).     The nurse will help you with your problem or have the doctor call you.     After hours and on weekends, please call the main hospital number ([phone]) and ask for the colon and rectal surgery person on call.     Some is available to help you 24 hours a day, seven days a week.    Call for:   ? Fever greater than 101 degrees   ? Chills   ? Foul-smelling drainage   ? Nausea and vomiting   ? Diarrhea - greater than 3 water stools in 24 hours   ? Constipation - no bowel movement after 3 days   ? Severe bleeding that does not stop soon after a bowel movement   ? Problems with the incision, including increased pain, swelling, or redness

## 2019-07-03 ENCOUNTER — TELEPHONE (OUTPATIENT)
Dept: SURGERY | Facility: CLINIC | Age: 39
End: 2019-07-03

## 2019-07-03 NOTE — TELEPHONE ENCOUNTER
Left voicemail message for patient to call back due to his procedure on 7/9/19 is moving from [place] to the [facility] at the [facility] (per April E and Dr Hackett).

## 2019-07-03 NOTE — TELEPHONE ENCOUNTER
Spoke to patient, confirmed that his procedure on 7/9/19 has been moved to the [facility] at the [facility]. Also sent [email or web address] message. Patient expressed understanding.

## 2019-07-08 ENCOUNTER — ANESTHESIA EVENT (OUTPATIENT)
Dept: SURGERY | Facility: AMBULATORY SURGERY CENTER | Age: 39
End: 2019-07-08

## 2019-07-09 ENCOUNTER — ANESTHESIA (OUTPATIENT)
Dept: SURGERY | Facility: AMBULATORY SURGERY CENTER | Age: 39
End: 2019-07-09

## 2019-07-09 ENCOUNTER — HOSPITAL ENCOUNTER (OUTPATIENT)
Facility: AMBULATORY SURGERY CENTER | Age: 39
End: 2019-07-09
Attending: COLON & RECTAL SURGERY
Payer: COMMERCIAL

## 2019-07-09 VITALS
HEART RATE: 50 BPM | BODY MASS INDEX: 26.48 KG/M2 | WEIGHT: 185 LBS | RESPIRATION RATE: 16 BRPM | SYSTOLIC BLOOD PRESSURE: 115 MMHG | TEMPERATURE: 97.7 F | HEIGHT: 70 IN | OXYGEN SATURATION: 97 % | DIASTOLIC BLOOD PRESSURE: 67 MMHG

## 2019-07-09 RX ORDER — OXYCODONE HYDROCHLORIDE 5 MG/1
5-10 TABLET ORAL EVERY 4 HOURS PRN
Status: DISCONTINUED | OUTPATIENT
Start: 2019-07-09 | End: 2019-07-10 | Stop reason: HOSPADM

## 2019-07-09 RX ORDER — NALOXONE HYDROCHLORIDE 0.4 MG/ML
.1-.4 INJECTION, SOLUTION INTRAMUSCULAR; INTRAVENOUS; SUBCUTANEOUS
Status: DISCONTINUED | OUTPATIENT
Start: 2019-07-09 | End: 2019-07-10 | Stop reason: HOSPADM

## 2019-07-09 RX ORDER — ONDANSETRON 4 MG/1
4 TABLET, ORALLY DISINTEGRATING ORAL EVERY 30 MIN PRN
Status: DISCONTINUED | OUTPATIENT
Start: 2019-07-09 | End: 2019-07-10 | Stop reason: HOSPADM

## 2019-07-09 RX ORDER — ALBUTEROL SULFATE 0.83 MG/ML
2.5 SOLUTION RESPIRATORY (INHALATION) EVERY 4 HOURS PRN
Status: DISCONTINUED | OUTPATIENT
Start: 2019-07-09 | End: 2019-07-10 | Stop reason: HOSPADM

## 2019-07-09 RX ORDER — FENTANYL CITRATE 50 UG/ML
25-50 INJECTION, SOLUTION INTRAMUSCULAR; INTRAVENOUS
Status: DISCONTINUED | OUTPATIENT
Start: 2019-07-09 | End: 2019-07-10 | Stop reason: HOSPADM

## 2019-07-09 RX ORDER — SODIUM CHLORIDE, SODIUM LACTATE, POTASSIUM CHLORIDE, CALCIUM CHLORIDE 600; 310; 30; 20 MG/100ML; MG/100ML; MG/100ML; MG/100ML
INJECTION, SOLUTION INTRAVENOUS CONTINUOUS
Status: DISCONTINUED | OUTPATIENT
Start: 2019-07-09 | End: 2019-07-10 | Stop reason: HOSPADM

## 2019-07-09 RX ORDER — GABAPENTIN 300 MG/1
300 CAPSULE ORAL ONCE
Status: DISCONTINUED | OUTPATIENT
Start: 2019-07-09 | End: 2019-07-10 | Stop reason: HOSPADM

## 2019-07-09 RX ORDER — ACETAMINOPHEN 325 MG/1
975 TABLET ORAL ONCE
Status: DISCONTINUED | OUTPATIENT
Start: 2019-07-09 | End: 2019-07-10 | Stop reason: HOSPADM

## 2019-07-09 RX ORDER — MEPERIDINE HYDROCHLORIDE 25 MG/ML
12.5 INJECTION INTRAMUSCULAR; INTRAVENOUS; SUBCUTANEOUS
Status: DISCONTINUED | OUTPATIENT
Start: 2019-07-09 | End: 2019-07-10 | Stop reason: HOSPADM

## 2019-07-09 RX ORDER — PROPOFOL 10 MG/ML
INJECTION, EMULSION INTRAVENOUS CONTINUOUS PRN
Status: DISCONTINUED | OUTPATIENT
Start: 2019-07-09 | End: 2019-07-09

## 2019-07-09 RX ORDER — ONDANSETRON 2 MG/ML
4 INJECTION INTRAMUSCULAR; INTRAVENOUS EVERY 30 MIN PRN
Status: DISCONTINUED | OUTPATIENT
Start: 2019-07-09 | End: 2019-07-10 | Stop reason: HOSPADM

## 2019-07-09 RX ORDER — KETOROLAC TROMETHAMINE 30 MG/ML
30 INJECTION, SOLUTION INTRAMUSCULAR; INTRAVENOUS EVERY 6 HOURS PRN
Status: DISCONTINUED | OUTPATIENT
Start: 2019-07-09 | End: 2019-07-10 | Stop reason: HOSPADM

## 2019-07-09 RX ORDER — PROPOFOL 10 MG/ML
INJECTION, EMULSION INTRAVENOUS PRN
Status: DISCONTINUED | OUTPATIENT
Start: 2019-07-09 | End: 2019-07-09

## 2019-07-09 RX ORDER — LIDOCAINE 40 MG/G
CREAM TOPICAL
Status: DISCONTINUED | OUTPATIENT
Start: 2019-07-09 | End: 2019-07-10 | Stop reason: HOSPADM

## 2019-07-09 RX ORDER — DEXAMETHASONE SODIUM PHOSPHATE 4 MG/ML
4 INJECTION, SOLUTION INTRA-ARTICULAR; INTRALESIONAL; INTRAMUSCULAR; INTRAVENOUS; SOFT TISSUE EVERY 10 MIN PRN
Status: DISCONTINUED | OUTPATIENT
Start: 2019-07-09 | End: 2019-07-10 | Stop reason: HOSPADM

## 2019-07-09 RX ORDER — LIDOCAINE HYDROCHLORIDE 20 MG/ML
INJECTION, SOLUTION INFILTRATION; PERINEURAL PRN
Status: DISCONTINUED | OUTPATIENT
Start: 2019-07-09 | End: 2019-07-09

## 2019-07-09 RX ORDER — HYDROMORPHONE HYDROCHLORIDE 1 MG/ML
.3-.5 INJECTION, SOLUTION INTRAMUSCULAR; INTRAVENOUS; SUBCUTANEOUS EVERY 10 MIN PRN
Status: DISCONTINUED | OUTPATIENT
Start: 2019-07-09 | End: 2019-07-10 | Stop reason: HOSPADM

## 2019-07-09 RX ADMIN — SODIUM CHLORIDE, SODIUM LACTATE, POTASSIUM CHLORIDE, CALCIUM CHLORIDE: 600; 310; 30; 20 INJECTION, SOLUTION INTRAVENOUS at 11:45

## 2019-07-09 RX ADMIN — PROPOFOL 150 MCG/KG/MIN: 10 INJECTION, EMULSION INTRAVENOUS at 11:53

## 2019-07-09 RX ADMIN — LIDOCAINE HYDROCHLORIDE 80 MG: 20 INJECTION, SOLUTION INFILTRATION; PERINEURAL at 11:53

## 2019-07-09 RX ADMIN — PROPOFOL 100 MG: 10 INJECTION, EMULSION INTRAVENOUS at 11:57

## 2019-07-09 ASSESSMENT — MIFFLIN-ST. JEOR: SCORE: 1760.4

## 2019-07-09 NOTE — ANESTHESIA POSTPROCEDURE EVALUATION
Anesthesia POST Procedure Evaluation    Patient: Meño Daniels   MRN:     [id number] Gender:   male   Age:    39 year old :      1980        Preoperative Diagnosis: Rectal Bleeding   Procedure(s):  Colonoscopy with random biopsies   Postop Comments: No value filed.       Anesthesia Type:  MAC  No value filed.    Reportable Event: NO     PAIN: Uncomplicated   Sign Out status: Comfortable, Well controlled pain     PONV: No PONV   Sign Out status:  No Nausea or Vomiting     Neuro/Psych: Uneventful perioperative course   Sign Out Status: Preoperative baseline; Age appropriate mentation     Airway/Resp.: Uneventful perioperative course   Sign Out Status: Non labored breathing, age appropriate RR; Resp. Status within EXPECTED Parameters     CV: Uneventful perioperative course   Sign Out status: Appropriate BP and perfusion indices; Appropriate HR/Rhythm     Disposition:   Sign Out in:  PACU  Disposition:  Phase II; Home  Recovery Course: Uneventful  Follow-Up: Not required           Last Anesthesia Record Vitals:  CRNA VITALS  2019 1155 - 2019 1255      2019             Pulse:  54    Ht Rate:  54    SpO2:  96 %    Resp Rate (set):  10          Last PACU Vitals:  Vitals Value Taken Time   BP     Temp     Pulse     Resp     SpO2     Temp src     NIBP 103/64 2019 12:16 PM   Pulse 54 2019 12:24 PM   SpO2 96 % 2019 12:24 PM   Resp     Temp     Ht Rate 54 2019 12:24 PM   Temp 2           Electronically Signed By: Osmar Simon DO, 2019, 2:09 PM

## 2019-07-09 NOTE — ANESTHESIA PREPROCEDURE EVALUATION
Anesthesia Pre-Procedure Evaluation    Patient: Meño Daniels   MRN:     [id number] Gender:   male   Age:    39 year old :      1980        Preoperative Diagnosis: Rectal Bleeding   Procedure(s):  Colonoscopy     Past Medical History:   Diagnosis Date     Asthma      Diarrhea      Plantar fasciitis      Rectal bleeding       Past Surgical History:   Procedure Laterality Date     wisdom teeth            Anesthesia Evaluation     . Pt has had prior anesthetic. Type of anesthetic: wisdom teeth.    No history of anesthetic complications          ROS/MED HX    ENT/Pulmonary:     (+)Intermittent asthma Treatment: Inhaler prn,  , . .    Neurologic:  - neg neurologic ROS     Cardiovascular:  - neg cardiovascular ROS   (+) ----. : . . . :. . Previous cardiac testing date:results:date: results:ECG reviewed date:12 results:SB date: results:          METS/Exercise Tolerance: Comment: Patient bikes 5 times a week and at most up to 8 hours a time.  >4 METS   Hematologic:  - neg hematologic  ROS       Musculoskeletal:   (+)  other musculoskeletal- plantar fasciitis       GI/Hepatic:     (+) Other GI/Hepatic rectal bleeding, diarrhea       Renal/Genitourinary:  - ROS Renal section negative       Endo:  - neg endo ROS       Psychiatric:  - neg psychiatric ROS       Infectious Disease:  - neg infectious disease ROS       Malignancy:      - no malignancy   Other:    (+) no H/O Chronic Pain,no other significant disability                        PHYSICAL EXAM:   Mental Status/Neuro: A/A/O   Airway: Facies: Feasible  Mallampati: I  Mouth/Opening: Full  TM distance: > 6 cm  Neck ROM: Full   Respiratory: Auscultation: CTAB     Resp. Rate: Normal     Resp. Effort: Normal      CV: Rhythm: Regular  Rate: Age appropriate  Heart: Normal Sounds   Comments:      Dental: Normal                  Lab Results   Component Value Date    WBC 8.7 2012    HGB 15.3 2012    HCT 45.7 2012     2012    .0  "03/27/2019    POTASSIUM 4.5 03/27/2019    CHLORIDE 101.0 03/27/2019    CO2 34.0 (H) 03/27/2019    BUN 14.0 03/27/2019    CR 1.0 03/27/2019    GLC 96.0 03/27/2019    EULA 9.7 03/27/2019    ALBUMIN 4.5 03/27/2019    PROTTOTAL 7.2 03/27/2019    ALT 30.0 03/27/2019    AST 37.0 03/27/2019    ALKPHOS 43.0 03/27/2019    BILITOTAL 1.5 (H) 03/27/2019    TSH 2.38 07/30/2012       Preop Vitals  BP Readings from Last 3 Encounters:   06/19/19 122/79   05/22/19 139/82   03/27/19 (!) 142/93    Pulse Readings from Last 3 Encounters:   06/19/19 52   05/22/19 (!) 49   03/27/19 50      Resp Readings from Last 3 Encounters:   06/19/19 14   03/27/19 16   05/14/18 20    SpO2 Readings from Last 3 Encounters:   06/19/19 98%   05/22/19 99%   03/27/19 99%      Temp Readings from Last 1 Encounters:   06/19/19 36.4  C (97.6  F) (Oral)    Ht Readings from Last 1 Encounters:   06/19/19 1.778 m (5' 10\")      Wt Readings from Last 1 Encounters:   06/19/19 84.3 kg (185 lb 14.4 oz)    Estimated body mass index is 26.67 kg/m  as calculated from the following:    Height as of 6/19/19: 1.778 m (5' 10\").    Weight as of 6/19/19: 84.3 kg (185 lb 14.4 oz).     LDA:            Assessment:   ASA SCORE: 2    NPO Status: > 6 hours since completed Solid Foods   Documentation: H&P complete; Preop Testing complete; Consents complete   Proceeding: Proceed without further delay  Tobacco Use:  NO Active use of Tobacco/UNKNOWN Tobacco use status     Plan:   Anes. Type:  MAC   Pre-Induction: Midazolam IV   Induction:  IV (Standard)   Airway: Native Airway   Access/Monitoring: PIV   Maintenance: Propofol; IV   Emergence: Procedure Site   Logistics: Same Day Surgery     PONV Management:  Adult Risk Factors:, Non-Smoker  Prevention: Propofol Infusion     CONSENT: Direct conversation   Plan and risks discussed with: Patient   Blood Products: Consent Deferred (Minimal Blood Loss)                         Osmar Simon, DO  "

## 2019-07-09 NOTE — ANESTHESIA CARE TRANSFER NOTE
Patient: Meño Daniels    Procedure(s):  Colonoscopy with random biopsies    Diagnosis: Rectal Bleeding  Diagnosis Additional Information: No value filed.    Anesthesia Type:   No value filed.     Note:  Airway :Room Air  Patient transferred to:Phase II  Comments: VSS and WNL, comfortable, no PONV, report to Brandy RNHandoff Report: Identifed the Patient, Identified the Reponsible Provider, Reviewed the pertinent medical history, Discussed the surgical course, Reviewed Intra-OP anesthesia mangement and issues during anesthesia, Set expectations for post-procedure period and Allowed opportunity for questions and acknowledgement of understanding      Vitals: (Last set prior to Anesthesia Care Transfer)    CRNA VITALS  7/9/2019 1155 - 7/9/2019 1232      7/9/2019             Pulse:  54    Ht Rate:  54    SpO2:  96 %    Resp Rate (set):  10                Electronically Signed By: EMA Rodriguez CRNA  July 9, 2019  12:32 PM

## 2019-07-09 NOTE — DISCHARGE INSTRUCTIONS
.Discharge Instructions after Colonoscopy or Sigmoidoscopy       Today you had a Colonoscopy      Activity and Diet   You were given medicine for pain. You may be dizzy or sleepy.   For 24 hours:     Do not drive or use heavy equipment.     Do not make important decisions.     Do not drink any alcohol.   You may return to your normal diet and medicines.       Discomfort     Air was placed in your colon during the exam in order to see it. Walking helps to pass the air.     You may take Tylenol (acetaminophen) for pain unless your doctor has told you not to.       Follow-up   We took small tissue samples or polyps to study. Your doctor will call you with the results within two weeks.       When to call:       Call right away if you have:     Unusual pain in belly or chest pain not relieved with passing air.     More than 1 to 2 Tablespoons of bleeding from your rectum.     Fever above 100.6  F (37.5  C).       If you have severe pain, bleeding, or shortness of breath, go to an emergency room.       If you have questions, call:   Monday to Friday, 7 a.m. to 4:30 p.m.   Endoscopy: [phone] (We may have to call you back)       After hours   Hospital: [phone] (Ask for the GI fellow on call)

## 2019-07-09 NOTE — OP NOTE
"Procedure Date: 07/09/2019.      PREOPERATIVE DIAGNOSES:   1.  Hematochezia.   2.  Intermittent diarrhea.   3.  Family history of colorectal cancer in a paternal grandfather at age 80.      POSTOPERATIVE DIAGNOSES:   1.  Hematochezia.   2.  Intermittent diarrhea.   3.  Family history of colorectal cancer in a paternal grandfather at age 80.      ANESTHESIA:  MAC.      PROCEDURE PERFORMED:  Colonoscopy with CO2 insufflation and random biopsies.      SURGEON:  Stephan Hackett MD      ASSISTANT:  Yane Melo MD (Colon and Rectal Surgery Fellow)      DESCRIPTION OF PROCEDURE:  After obtaining informed consent, the patient was brought to the operating room and placed in the left lateral decubitus position.  MAC anesthesia was gently induced without difficulty.  The patient received appropriate mechanical DVT prophylaxis.  All pressure points were cushioned.  A \"time out\" was performed.  A pediatric colonoscope was then passed transanally and gently passed all the way to the cecum without difficulty.  The quality of the prep was good.  Identification of the appendiceal orifice and ileocecal valve was performed, and this was documented with endoscopic pictures.  The terminal ileum was intubated, and this appeared to be normal.  The colonoscope was then withdrawn from the cecum all the way to the rectum over the course of 10 minutes, and there was no evidence of inflammation, ulceration or neoplasia.  Random mucosal biopsies were taken with a cold biopsy forceps at the right and left colon, and these were sent for permanent pathology.  Retroflexion was performed in the rectum, and this revealed grade 1 internal hemorrhoids.  The patient's digital rectal exam showed no masses and good sphincter tone.  The colonoscope was then removed.  The patient tolerated the procedure well.      COMPLICATIONS:  None immediately.      ESTIMATED BLOOD LOSS:  None.      REPLACEMENT:  300 mL of crystalloid      SPECIMENS:  Random mucosal " biopsies of the right and left colon.      FINDINGS:  Normal colonoscopy.  Repeat colonoscopy in 10 years, but will need to await pathology results for final recommendation.      DISPOSITION:  PACU.         AMANUEL MARTINEZ MD             D: 2019   T: 2019   MT: autumn      Name:     EKTA BETTENCOURT   MRN:      [id number]        Account:        [id number]   :      1980           Procedure Date: 2019      Document: [id number]       cc: Simran Wood MSN, NP-C       Erendira Gómez APRN [facility] Billing

## 2019-07-09 NOTE — BRIEF OP NOTE
[facility]    Brief Operative Note    Pre-operative diagnosis: Rectal Bleeding  Post-operative diagnosis Colonoscopy w random biopsies  Procedure: Procedure(s):  Colonoscopy with random biopsies  Surgeon: Surgeon(s) and Role:     * Stephan Hackett MD - Primary     * Yane Melo MD - Assisting  Anesthesia: Monitor Anesthesia Care   Estimated blood loss: Minimal  Drains: None  Specimens:   ID Type Source Tests Collected by Time Destination   A : Right Colon Tissue Colon SURGICAL PATHOLOGY EXAM Stephan Hackett MD 7/9/2019 12:17 PM    B : Left Colon Tissue Colon SURGICAL PATHOLOGY EXAM Stephan Hackett MD 7/9/2019 12:19 PM      Findings:   normal appearing anatomy; 6 biopsies of each the right and left colon, send to pathology in separate containers.  Retroflex w mild grade I internal hemorrhoids..  Complications: None.  Implants:  * No implants in log *     [place] MiCROS Addendum    Operative time: 27    Is this an ERAS patient? no    Did the patient need a transfusion? no  If Yes, please list the type and amount of transfusion: na    Segment of colon removed: na    Type of anastomosis (i.e. stapled, hand-sewn): na    Was luminal betadine used? no    Hydropneumatic testing (positive or negative): no    Was the patient diverted? no  If yes, what type of fecal diversion? na    Hinchey class (if applicable): na

## 2019-07-10 LAB — COPATH REPORT: NORMAL

## 2019-07-11 ENCOUNTER — TELEPHONE (OUTPATIENT)
Dept: SURGERY | Facility: CLINIC | Age: 39
End: 2019-07-11

## 2019-07-11 NOTE — TELEPHONE ENCOUNTER
Called patient to set up f/u appt after his colonoscopy. No answer, LM for patient with direct number to call back to schedule.    Donya Damico LPN

## 2019-07-12 NOTE — TELEPHONE ENCOUNTER
Pt returned call and left voice message. Attempt to call again, went straight to . Left message again with direct number to call back.    Donya Damico LPN

## 2019-09-06 ENCOUNTER — TELEPHONE (OUTPATIENT)
Dept: GASTROENTEROLOGY | Facility: CLINIC | Age: 39
End: 2019-09-06

## 2019-09-10 ENCOUNTER — OFFICE VISIT (OUTPATIENT)
Dept: GASTROENTEROLOGY | Facility: CLINIC | Age: 39
End: 2019-09-10
Payer: COMMERCIAL

## 2019-09-10 VITALS
SYSTOLIC BLOOD PRESSURE: 134 MMHG | WEIGHT: 184.9 LBS | HEIGHT: 70 IN | HEART RATE: 47 BPM | BODY MASS INDEX: 26.47 KG/M2 | OXYGEN SATURATION: 96 % | DIASTOLIC BLOOD PRESSURE: 80 MMHG

## 2019-09-10 DIAGNOSIS — R13.10 DYSPHAGIA, UNSPECIFIED TYPE: ICD-10-CM

## 2019-09-10 DIAGNOSIS — R19.5 LOOSE STOOLS: Primary | ICD-10-CM

## 2019-09-10 ASSESSMENT — ENCOUNTER SYMPTOMS
ABDOMINAL PAIN: 0
BOWEL INCONTINENCE: 0
JAUNDICE: 0
CONSTIPATION: 0
RECTAL PAIN: 0
BLOATING: 0
NAUSEA: 0
VOMITING: 0
BLOOD IN STOOL: 0
HEARTBURN: 0
DIARRHEA: 1

## 2019-09-10 ASSESSMENT — MIFFLIN-ST. JEOR: SCORE: 1759.95

## 2019-09-10 NOTE — NURSING NOTE
"Chief Complaint   Patient presents with     New Patient     new consult       Vitals:    09/10/19 1158   BP: 134/80   Pulse: (!) 47   SpO2: 96%   Weight: 83.9 kg (184 lb 14.4 oz)   Height: 1.778 m (5' 10\")       Body mass index is 26.53 kg/m .    Tierra Damico CMA    "

## 2019-09-10 NOTE — LETTER
9/10/2019       RE: Meño Daniels  [address]  [address]  [place] [address]     Dear Colleague,    Thank you for referring your patient, Meño Daniels, to the [facility] GASTRO[facility]OLOG[facility] IBD CLINIC at [facility]. Please see a copy of my visit note below.    GI CLINIC VISIT    CC/REFERRING MD:  Simran Low  REASON FOR CONSULTATION: Diarrhea    ASSESSMENT/PLAN:  1. Diarrhea  Patient is a 39-year-old male with long-standing history of intermittent diarrhea, associated with urgency and occasional rectal bleeding.  Colonoscopy without evidence of inflammatory bowel disease or microscopic colitis.  We will plan to evaluate with TSH, celiac markers with upper endoscopy if indicated.  Symptoms may be due to irritable bowel syndrome with diarrhea, functional diarrhea, lactose intolerance or other food sensitivity.  Would recommend that patient does 2-week lactose-free trial.  If significant improvement in symptoms, would recommend avoiding lactose long-term or using Lactaid supplements as needed.  May also benefit from looking into foods from low FODMAP diet, can discuss this further with our dietitian.  The meantime, would recommend resuming fiber supplementation on a daily basis.  Would recommend starting low and increasing to effect.  Future considerations include treatment with Imodium or trial of rifaximin.  -- labs (TSH and celiac markers)  -- would recommend 2 week lactose-free trial. Can also look into low FODMAP diet. Could consider meeting with our dietitian   -- Soluble fiber supplementation on a daily basis can help regulate the consistency of your stools. Metamucil, citrucel or benefiber are all examples. You can start with 1 teaspoon per day and if tolerated, may increase up to 2 tablespoons per day. You may experience some bloating with initiation of fiber supplementation that will improve over the first month.  A good fiber trial to evaluate  the effect is 3-6 months.    2.  Rare dysphagia  Patient reports occasional dysphasia 1-2 times a year with history of food impaction in the early 2000s.  Will obtain records to see if patient had evidence of eosinophilic esophagitis.  Should continue good swallowing hygiene, symptoms are stable but will pursue upper endoscopy if dysphagia becomes more common.  -- will obtain records from upper endoscopy     Colorectal cancer screening: No evidence of adenomatous polyps in 2019, repeat at age 50 or sooner with change or worsening symptoms.    Return to GI Clinic pending response to above treatment     Thank you for this consultation.  It was a pleasure to participate in the care of this patient; please contact us with any further questions.     Gauri Shepherd PA-C  Division of Gastroenterology, Hepatology & Nutrition  [facility]  Meño Daniels is a 39 year old male referred from colorectal surgery for evaluation of intermittenet diarrhea, rectal bleeding, urgency with bowel movements.    After patient's visit with colorectal surgery, underwent colonoscopy 7/9/2019. TI appeared normal, no evidence of inflammation, ulceration or neoplasia, grade 1 internal hemorrhoids. Unremarkable random biopsies.    Today patient notes intermittent diarrhea associated with urgency starting in childhood.  Describes on a good day, he will have 2 bowel movements which are [place] scale 2-4 consistency.  On a bad day, he can have up to 10-12 bowel movements which are [place] scale 5-7, can be associated with bright red blood 2 to irritation.  States loose bowel movements are very urgent and she will have to immediately find the bathroom.  States a [employer] bowel movements are loose.  After seeing colorectal surgery, tried fiber supplementation for a couple weeks which did not seem to make a difference and he has discontinued.  Unclear what dose he was taking.  Notes associated abdominal discomfort with  "holding in bowel movement which improves with defecation.  Initially thought greasy foods trigger symptoms, now avoids cheese.  Also thought onions may contribute to increased gas occasionally.      Patient reports history of food impaction in the early 2000 and which had an inpatient upper endoscopy.  Was told he had a \"cat like \"esophagus.  Now reports on average he will have a sensation of food getting stuck 1-2 times a year typically with steak which is removed with coughing.    ROS:    No fevers or chills  No weight loss  No blurry vision, double vision or change in vision  No sore throat  No lymphadenopathy  No headache, paraesthesias, or weakness in a limb  No shortness of breath or wheezing  No chest pain or pressure  No arthralgias or myalgias  No rashes or skin changes  No odynophagia or dysphagia  No BRBPR, hematochezia, melena  + bright red blood   No dysuria, frequency or urgency  No hot/cold intolerance or polyria  No anxiety or depression    PROBLEM LIST  Patient Active Problem List    Diagnosis Date Noted     CARDIOVASCULAR SCREENING; LDL GOAL LESS THAN 160 12/04/2014     Priority: Medium       PERTINENT PAST MEDICAL HISTORY:  Past Medical History:   Diagnosis Date     Asthma      Diarrhea      Plantar fasciitis      Rectal bleeding        PREVIOUS SURGERIES:  Past Surgical History:   Procedure Laterality Date     COLONOSCOPY N/A 7/9/2019    Procedure: Colonoscopy with random biopsies;  Surgeon: Stephan Hackett MD;  Location: [facility] OR     wisdom teeth         PREVIOUS ENDOSCOPY:  Early 2000s     ALLERGIES:   No Known Allergies    PERTINENT MEDICATIONS:    Current Outpatient Medications:      albuterol (PROAIR HFA/PROVENTIL HFA/VENTOLIN HFA) 108 (90 Base) MCG/ACT inhaler, Inhale 2 puffs into the lungs every 6 hours as needed for shortness of breath / dyspnea or wheezing (Patient taking differently: Inhale 2 puffs into the lungs as needed for shortness of breath / dyspnea or wheezing ), Disp: " 6.7 g, Rfl: 0    SOCIAL HISTORY:  Occasional alcohol (wine with dinner)- has gotten headaches with wine and cut back, no drug use or smoking   Social History     Socioeconomic History     Marital status:      Spouse name: Not on file     Number of children: 0     Years of education: Not on file     Highest education level: Not on file   Occupational History     Occupation: [employer]     Comment:    Social Needs     Financial resource strain: Not on file     Food insecurity:     Worry: Not on file     Inability: Not on file     Transportation needs:     Medical: Not on file     Non-medical: Not on file   Tobacco Use     Smoking status: Never Smoker     Smokeless tobacco: Never Used   Substance and Sexual Activity     Alcohol use: Yes     Comment: social     Drug use: No     Sexual activity: Yes     Partners: Female   Lifestyle     Physical activity:     Days per week: Not on file     Minutes per session: Not on file     Stress: Not on file   Relationships     Social connections:     Talks on phone: Not on file     Gets together: Not on file     Attends Anabaptism service: Not on file     Active member of club or organization: Not on file     Attends meetings of clubs or organizations: Not on file     Relationship status: Not on file     Intimate partner violence:     Fear of current or ex partner: Not on file     Emotionally abused: Not on file     Physically abused: Not on file     Forced sexual activity: Not on file   Other Topics Concern     Parent/sibling w/ CABG, MI or angioplasty before 65F 55M? 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    Went to [facility] [employer] cycl[facility], bikes to work, wears a bike helmet       FAMILY HISTORY:  FH of CRC: paternal grandfather in old age (80s)  FH of IBD: none   Maternal grandmother with lactose intolerance  Family History   Problem Relation Age of Onset     Colon Cancer Paternal  "Grandfather      Cerebrovascular Disease Maternal Grandmother      Cardiovascular Maternal Grandfather         open heart surgery        Past/family/social history reviewed and no changes    PHYSICAL EXAMINATION:  Constitutional: aaox3, cooperative, pleasant, not dyspneic/diaphoretic, no acute distress  Vitals reviewed: /80   Pulse (!) 47   Ht 1.778 m (5' 10\")   Wt 83.9 kg (184 lb 14.4 oz)   SpO2 96%   BMI 26.53 kg/m     Wt:   Wt Readings from Last 2 Encounters:   09/10/19 83.9 kg (184 lb 14.4 oz)   08/21/19 84.1 kg (185 lb 8 oz)      Eyes: Sclera anicteric/injected  Ears/nose/mouth/throat: Normal oropharynx without ulcers or exudate, mucus membranes moist, hearing intact  Neck: supple, thyroid normal size  CV: No edema  Respiratory: Unlabored breathing  Lymph: No submandibular, supraclavicular or  lymphadenopathy  Abd: Nondistended, no hepatosplenomegaly, nontender, no peritoneal signs  Skin: warm, perfused, no jaundice  Psych: Normal affect  MSK: Normal gait    PERTINENT STUDIES:    Office Visit on 03/27/2019   Component Date Value Ref Range Status     FASTING SPECIMEN 03/27/2019 no   Final     Cholesterol 03/27/2019 216.0* 0.0 - 200.0 Final     HDL Cholesterol 03/27/2019 82.0  >40.0 Final     Triglycerides 03/27/2019 70.0  0.0 - 150.0 Final     Cholesterol/HDL Ratio 03/27/2019 2.6  0.0 - 5.0 Final     LDL Cholesterol Direct 03/27/2019 120.0  0.0 - 129.0 Final     VLDL-Cholesterol 03/27/2019 14.0  7.0 - 32.0 Final     Glucose 03/27/2019 96.0  60.0 - 109.0 mg/dL Final     Urea Nitrogen 03/27/2019 14.0  5.0 - 24.0 mg/dL Final     Calcium 03/27/2019 9.7  8.5 - 10.4 mg/dL Final     Creatinine 03/27/2019 1.0  0.8 - 1.5 mg/dL Final     eGFR Calculated (Non Black Referen* 03/27/2019 88.9  >60.0 Final     eGFR Calculated (Black Reference) 03/27/2019 107.5  >60.0 Final     Sodium 03/27/2019 144.0  136.0 - 145.0 mmol/L Final     Potassium 03/27/2019 4.5  3.4 - 5.3 mmol/L Final     Chloride 03/27/2019 101.0  94.0 " - 109.0 mmol/L Final     Carbon Dioxide 03/27/2019 34.0* 20.0 - 32.0 mmol/L Final     Albumin 03/27/2019 4.5  3.3 - 4.6 g/dL Final     Alkaline Phosphatase 03/27/2019 43.0  40.0 - 150.0 U/L Final     ALT 03/27/2019 30.0  0.0 - 70.0 U/L Final     AST 03/27/2019 37.0  0.0 - 55.0 U/L Final     Bilirubin Total 03/27/2019 1.5* 0.2 - 1.3 mg/dL Final     Protein Total 03/27/2019 7.2  6.8 - 8.8 g/dL Final       Again, thank you for allowing me to participate in the care of your patient.      Sincerely,    Gauri Shepherd PA-C

## 2019-09-10 NOTE — PROGRESS NOTES
GI CLINIC VISIT    CC/REFERRING MD:  Simran Johnson*  REASON FOR CONSULTATION: Diarrhea    ASSESSMENT/PLAN:  1. Diarrhea  Patient is a 39-year-old male with long-standing history of intermittent diarrhea, associated with urgency and occasional rectal bleeding.  Colonoscopy without evidence of inflammatory bowel disease or microscopic colitis.  We will plan to evaluate with TSH, celiac markers with upper endoscopy if indicated.  Symptoms may be due to irritable bowel syndrome with diarrhea, functional diarrhea, lactose intolerance or other food sensitivity.  Would recommend that patient does 2-week lactose-free trial.  If significant improvement in symptoms, would recommend avoiding lactose long-term or using Lactaid supplements as needed.  May also benefit from looking into foods from low FODMAP diet, can discuss this further with our dietitian.  The meantime, would recommend resuming fiber supplementation on a daily basis.  Would recommend starting low and increasing to effect.  Future considerations include treatment with Imodium or trial of rifaximin.  -- labs (TSH and celiac markers)  -- would recommend 2 week lactose-free trial. Can also look into low FODMAP diet. Could consider meeting with our dietitian   -- Soluble fiber supplementation on a daily basis can help regulate the consistency of your stools. Metamucil, citrucel or benefiber are all examples. You can start with 1 teaspoon per day and if tolerated, may increase up to 2 tablespoons per day. You may experience some bloating with initiation of fiber supplementation that will improve over the first month.  A good fiber trial to evaluate the effect is 3-6 months.    2.  Rare dysphagia  Patient reports occasional dysphasia 1-2 times a year with history of food impaction in the early 2000s.  Will obtain records to see if patient had evidence of eosinophilic esophagitis.  Should continue good swallowing hygiene, symptoms are stable but will pursue  upper endoscopy if dysphagia becomes more common.  -- will obtain records from upper endoscopy     Colorectal cancer screening: No evidence of adenomatous polyps in 2019, repeat at age 50 or sooner with change or worsening symptoms.    Return to GI Clinic pending response to above treatment     Thank you for this consultation.  It was a pleasure to participate in the care of this patient; please contact us with any further questions.     Gauri Shepherd PA-C  Division of Gastroenterology, Hepatology & Nutrition  [facility]  Meño Daniels is a 39 year old male referred from colorectal surgery for evaluation of intermittenet diarrhea, rectal bleeding, urgency with bowel movements.    After patient's visit with colorectal surgery, underwent colonoscopy 7/9/2019. TI appeared normal, no evidence of inflammation, ulceration or neoplasia, grade 1 internal hemorrhoids. Unremarkable random biopsies.    Today patient notes intermittent diarrhea associated with urgency starting in childhood.  Describes on a good day, he will have 2 bowel movements which are Henderson scale 2-4 consistency.  On a bad day, he can have up to 10-12 bowel movements which are Henderson scale 5-7, can be associated with bright red blood 2 to irritation.  States loose bowel movements are very urgent and she will have to immediately find the bathroom.  States a bout [employer] bowel movements are loose.  After seeing colorectal surgery, tried fiber supplementation for a couple weeks which did not seem to make a difference and he has discontinued.  Unclear what dose he was taking.  Notes associated abdominal discomfort with holding in bowel movement which improves with defecation.  Initially thought greasy foods trigger symptoms, now avoids cheese.  Also thought onions may contribute to increased gas occasionally.      Patient reports history of food impaction in the early 2000 and which had an inpatient upper endoscopy.  Was told  "he had a \"cat like \"esophagus.  Now reports on average he will have a sensation of food getting stuck 1-2 times a year typically with steak which is removed with coughing.    ROS:    No fevers or chills  No weight loss  No blurry vision, double vision or change in vision  No sore throat  No lymphadenopathy  No headache, paraesthesias, or weakness in a limb  No shortness of breath or wheezing  No chest pain or pressure  No arthralgias or myalgias  No rashes or skin changes  No odynophagia or dysphagia  No BRBPR, hematochezia, melena  + bright red blood   No dysuria, frequency or urgency  No hot/cold intolerance or polyria  No anxiety or depression    PROBLEM LIST  Patient Active Problem List    Diagnosis Date Noted     CARDIOVASCULAR SCREENING; LDL GOAL LESS THAN 160 12/04/2014     Priority: Medium       PERTINENT PAST MEDICAL HISTORY:  Past Medical History:   Diagnosis Date     Asthma      Diarrhea      Plantar fasciitis      Rectal bleeding        PREVIOUS SURGERIES:  Past Surgical History:   Procedure Laterality Date     COLONOSCOPY N/A 7/9/2019    Procedure: Colonoscopy with random biopsies;  Surgeon: Stephan Hackett MD;  Location: [facility] OR     wisdom teeth         PREVIOUS ENDOSCOPY:  Early 2000s     ALLERGIES:   No Known Allergies    PERTINENT MEDICATIONS:    Current Outpatient Medications:      albuterol (PROAIR HFA/PROVENTIL HFA/VENTOLIN HFA) 108 (90 Base) MCG/ACT inhaler, Inhale 2 puffs into the lungs every 6 hours as needed for shortness of breath / dyspnea or wheezing (Patient taking differently: Inhale 2 puffs into the lungs as needed for shortness of breath / dyspnea or wheezing ), Disp: 6.7 g, Rfl: 0    SOCIAL HISTORY:  Occasional alcohol (wine with dinner)- has gotten headaches with wine and cut back, no drug use or smoking   Social History     Socioeconomic History     Marital status:      Spouse name: Not on file     Number of children: 0     Years of education: Not on file     Highest " education level: Not on file   Occupational History     Occupation: [employer]     Comment:    Social Needs     Financial resource strain: Not on file     Food insecurity:     Worry: Not on file     Inability: Not on file     Transportation needs:     Medical: Not on file     Non-medical: Not on file   Tobacco Use     Smoking status: Never Smoker     Smokeless tobacco: Never Used   Substance and Sexual Activity     Alcohol use: Yes     Comment: social     Drug use: No     Sexual activity: Yes     Partners: Female   Lifestyle     Physical activity:     Days per week: Not on file     Minutes per session: Not on file     Stress: Not on file   Relationships     Social connections:     Talks on phone: Not on file     Gets together: Not on file     Attends [employer] service: Not on file     Active member of club or organization: Not on file     Attends meetings of clubs or organizations: Not on file     Relationship status: Not on file     Intimate partner violence:     Fear of current or ex partner: Not on file     Emotionally abused: Not on file     Physically abused: Not on file     Forced sexual activity: Not on file   Other Topics Concern     Parent/sibling w/ CABG, MI or angioplasty before 65F 55M? 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    Went to [facility] for [employer] cyclist, bikes to work, wears a bike helmet       FAMILY HISTORY:  FH of CRC: paternal grandfather in old age (80s)  FH of IBD: none   Maternal grandmother with lactose intolerance  Family History   Problem Relation Age of Onset     Colon Cancer Paternal Grandfather      Cerebrovascular Disease Maternal Grandmother      Cardiovascular Maternal Grandfather         open heart surgery        Past/family/social history reviewed and no changes    PHYSICAL EXAMINATION:  Constitutional: aaox3, cooperative, pleasant, not dyspneic/diaphoretic, no acute distress  Vitals  "reviewed: /80   Pulse (!) 47   Ht 1.778 m (5' 10\")   Wt 83.9 kg (184 lb 14.4 oz)   SpO2 96%   BMI 26.53 kg/m    Wt:   Wt Readings from Last 2 Encounters:   09/10/19 83.9 kg (184 lb 14.4 oz)   08/21/19 84.1 kg (185 lb 8 oz)      Eyes: Sclera anicteric/injected  Ears/nose/mouth/throat: Normal oropharynx without ulcers or exudate, mucus membranes moist, hearing intact  Neck: supple, thyroid normal size  CV: No edema  Respiratory: Unlabored breathing  Lymph: No submandibular, supraclavicular or  lymphadenopathy  Abd: Nondistended, no hepatosplenomegaly, nontender, no peritoneal signs  Skin: warm, perfused, no jaundice  Psych: Normal affect  MSK: Normal gait      PERTINENT STUDIES:    Office Visit on 03/27/2019   Component Date Value Ref Range Status     FASTING SPECIMEN 03/27/2019 no   Final     Cholesterol 03/27/2019 216.0* 0.0 - 200.0 Final     HDL Cholesterol 03/27/2019 82.0  >40.0 Final     Triglycerides 03/27/2019 70.0  0.0 - 150.0 Final     Cholesterol/HDL Ratio 03/27/2019 2.6  0.0 - 5.0 Final     LDL Cholesterol Direct 03/27/2019 120.0  0.0 - 129.0 Final     VLDL-Cholesterol 03/27/2019 14.0  7.0 - 32.0 Final     Glucose 03/27/2019 96.0  60.0 - 109.0 mg/dL Final     Urea Nitrogen 03/27/2019 14.0  5.0 - 24.0 mg/dL Final     Calcium 03/27/2019 9.7  8.5 - 10.4 mg/dL Final     Creatinine 03/27/2019 1.0  0.8 - 1.5 mg/dL Final     eGFR Calculated (Non Black Referen* 03/27/2019 88.9  >60.0 Final     eGFR Calculated (Black Reference) 03/27/2019 107.5  >60.0 Final     Sodium 03/27/2019 144.0  136.0 - 145.0 mmol/L Final     Potassium 03/27/2019 4.5  3.4 - 5.3 mmol/L Final     Chloride 03/27/2019 101.0  94.0 - 109.0 mmol/L Final     Carbon Dioxide 03/27/2019 34.0* 20.0 - 32.0 mmol/L Final     Albumin 03/27/2019 4.5  3.3 - 4.6 g/dL Final     Alkaline Phosphatase 03/27/2019 43.0  40.0 - 150.0 U/L Final     ALT 03/27/2019 30.0  0.0 - 70.0 U/L Final     AST 03/27/2019 37.0  0.0 - 55.0 U/L Final     Bilirubin Total " 03/27/2019 1.5* 0.2 - 1.3 mg/dL Final     Protein Total 03/27/2019 7.2  6.8 - 8.8 g/dL Final         Answers for HPI/ROS submitted by the patient on 9/10/2019   General Symptoms: No  Skin Symptoms: No  HENT Symptoms: No  EYE SYMPTOMS: No  HEART SYMPTOMS: No  LUNG SYMPTOMS: No  INTESTINAL SYMPTOMS: Yes  URINARY SYMPTOMS: No  REPRODUCTIVE SYMPTOMS: No  SKELETAL SYMPTOMS: No  BLOOD SYMPTOMS: No  NERVOUS SYSTEM SYMPTOMS: No  MENTAL HEALTH SYMPTOMS: No  Heart burn or indigestion: No  Nausea: No  Vomiting: No  Abdominal pain: No  Bloating: No  Constipation: No  Diarrhea: Yes  Blood in stool: No  Black stools: No  Rectal or Anal pain: No  Fecal incontinence: No  Yellowing of skin or eyes: No  Vomit with blood: No  Change in stools: No

## 2019-11-07 ENCOUNTER — HEALTH MAINTENANCE LETTER (OUTPATIENT)
Age: 39
End: 2019-11-07

## 2019-12-09 ENCOUNTER — OFFICE VISIT (OUTPATIENT)
Dept: FAMILY MEDICINE | Facility: CLINIC | Age: 39
End: 2019-12-09
Payer: COMMERCIAL

## 2019-12-09 VITALS
HEIGHT: 70 IN | HEART RATE: 59 BPM | DIASTOLIC BLOOD PRESSURE: 81 MMHG | BODY MASS INDEX: 27.77 KG/M2 | SYSTOLIC BLOOD PRESSURE: 126 MMHG | WEIGHT: 194 LBS | RESPIRATION RATE: 15 BRPM | TEMPERATURE: 97.6 F | OXYGEN SATURATION: 97 %

## 2019-12-09 DIAGNOSIS — R06.02 SOB (SHORTNESS OF BREATH): ICD-10-CM

## 2019-12-09 DIAGNOSIS — L72.9 SCALP CYST: Primary | ICD-10-CM

## 2019-12-09 DIAGNOSIS — R05.9 COUGH: ICD-10-CM

## 2019-12-09 RX ORDER — ALBUTEROL SULFATE 90 UG/1
2 AEROSOL, METERED RESPIRATORY (INHALATION) EVERY 6 HOURS PRN
Qty: 6.7 G | Refills: 0 | Status: SHIPPED | OUTPATIENT
Start: 2019-12-09

## 2019-12-09 RX ORDER — FAMOTIDINE 20 MG
TABLET ORAL
COMMUNITY

## 2019-12-09 ASSESSMENT — MIFFLIN-ST. JEOR: SCORE: 1801.23

## 2019-12-09 NOTE — NURSING NOTE
"39 year old  Chief Complaint   Patient presents with     Derm Problem     pt has a cyst at the top of his head he reports the bump is getting bigger     Asthma       Blood pressure 126/81, pulse 59, temperature 97.6  F (36.4  C), temperature source Oral, resp. rate 15, height 1.778 m (5' 10\"), weight 88 kg (194 lb), SpO2 97 %. Body mass index is 27.84 kg/m .  Patient Active Problem List   Diagnosis     CARDIOVASCULAR SCREENING; LDL GOAL LESS THAN 160       Wt Readings from Last 2 Encounters:   12/09/19 88 kg (194 lb)   09/10/19 83.9 kg (184 lb 14.4 oz)     BP Readings from Last 3 Encounters:   12/09/19 126/81   09/10/19 134/80   08/21/19 103/65         Current Outpatient Medications   Medication     Vitamin D, Cholecalciferol, 25 MCG (1000 UT) CAPS     albuterol (PROAIR HFA/PROVENTIL HFA/VENTOLIN HFA) 108 (90 Base) MCG/ACT inhaler     No current facility-administered medications for this visit.        Social History     Tobacco Use     Smoking status: Never Smoker     Smokeless tobacco: Never Used   Substance Use Topics     Alcohol use: Yes     Comment: social     Drug use: No       Health Maintenance Due   Topic Date Due     ASTHMA ACTION PLAN  1980     ASTHMA CONTROL TEST  09/27/2019       No results found for: PAP      December 9, 2019 8:23 AM  "

## 2019-12-09 NOTE — PROGRESS NOTES
REASON FOR VISIT:  Cysts on Scalp; Exercise Induced Asthma      HISTORY OF PRESENT ILLNESS: Meño Daniels is a 39 year old male who presents with 2 cysts on his scalp.  I have met Meño once in the past, on 03/27/2019 for a CPE.  He sent me a Shineon message on 12/2/2019, noting he would like to have these removed.  Today, he is asking this cyst be evaluated, as he has noticed these cysts become visible when he cuts his hair short.  These cysts have been present for over a year.  He has had similar cysts in the past, both on his shoulder and scalp, but has not had these removed or injected.  He has normally allowed these to resolve on their own.    Meño has a history of asthma, which is currently well managed with albuterol.  His asthma is triggered with exercise.  He is requesting refills.    ACT Total Scores 3/28/2019 12/9/2019   ACT TOTAL SCORE (Goal Greater than or Equal to 20) 22 23   In the past 12 months, how many times did you visit the emergency room for your asthma without being admitted to the hospital? 0 0   In the past 12 months, how many times were you hospitalized overnight because of your asthma? 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     Current Outpatient Medications   Medication Sig Dispense Refill     Vitamin D, Cholecalciferol, 25 MCG (1000 UT) CAPS        albuterol (PROAIR HFA/PROVENTIL HFA/VENTOLIN HFA) 108 (90 Base) MCG/ACT inhaler Inhale 2 puffs into the lungs every 6 hours as needed for shortness of breath / dyspnea or wheezing (Patient taking differently: Inhale 2 puffs into the lungs as needed for shortness of breath / dyspnea or wheezing ) 6.7 g 0       REVIEW OF SYSTEMS:  POSITIVE for cyst on scalp.  Previous LEFT heel pain, improved with rolling and PT.   "Previous RIGHT 1st toe pain, resolved with PT.  Otherwise, the ROS is negative.      OBJECTIVE:      EXAM: Meño is a 39 year old male, who appears to be physically active.  VITAL SIGNS: /81   Pulse 59   Temp 97.6  F (36.4  C) (Oral)   Resp 15   Ht 1.778 m (5' 10\")   Wt 88 kg (194 lb)   SpO2 97%   BMI 27.84 kg/m    Constitutional: healthy, alert and no distress   Scalp: Two mobile, rubbery cysts noted, 1 is about 1cm in diameter and to the Right of midline and on the cradle of scalp, the other is about 1.5 cm in diameter just to the LEFT of midline and overlying the posterior aspect of the frontal bone.         ASSESSMENT AND PLAN:   1. 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      Plan:  -Referred to Dermatology for evaluation and treatment.   -Return to clinic otherwise as needed.     2. Asthma: Stable. Only with exercise and only intermittently    -filled out asthma action plan  -Also, sent in new Rx for Albuteral    Call with any problems or questions.          I, Brian Power, am serving as a scribe to document services personally performed by Cody Stephens MD based on data collection and the provider's statements to me. Dr. Stephens has reviewed, edited and approved the above note.     --Cody Stephens MD  "

## 2019-12-09 NOTE — PATIENT INSTRUCTIONS
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     Plan:  -Referred to Dermatology for evaluation and treatment.   -Return to clinic otherwise as needed.

## 2019-12-09 NOTE — LETTER
My Asthma Action Plan    Name: Meño Daniels   YOB: 1980  Date: 12/9/2019   My doctor: Cody Stephens MD   My clinic: [facility]        My Rescue Medicine:   Albuterol inhaler (Proair/Ventolin/Proventil HFA)  2-4 puffs EVERY 4 HOURS as needed. Use a spacer if recommended by your provider.   My Asthma Severity:   Intermittent / Exercise Induced  Know your asthma triggers: exercise or sports             GREEN ZONE   Good Control    I feel good    No cough or wheeze    Can work, sleep and play without asthma symptoms       Take your asthma control medicine every day.     1. If exercise triggers your asthma, take your rescue medication    15 minutes before exercise or sports, and    During exercise if you have asthma symptoms  2. Spacer to use with inhaler: If you have a spacer, make sure to use it with your inhaler             YELLOW ZONE Getting Worse  I have ANY of these:    I do not feel good    Cough or wheeze    Chest feels tight    Wake up at night   1. Keep taking your Green Zone medications  2. Start taking your rescue medicine:    every 20 minutes for up to 1 hour. Then every 4 hours for 24-48 hours.  3. If you stay in the Yellow Zone for more than 12-24 hours, contact your doctor.  4. If you do not return to the Green Zone in 12-24 hours or you get worse, start taking your oral steroid medicine if prescribed by your provider.           RED ZONE Medical Alert - Get Help  I have ANY of these:    I feel awful    Medicine is not helping    Breathing getting harder    Trouble walking or talking    Nose opens wide to breathe       1. Take your rescue medicine NOW  2. If your provider has prescribed an oral steroid medicine, start taking it NOW  3. Call your doctor NOW  4. If you are still in the Red Zone after 20 minutes and you have not reached your doctor:    Take your rescue medicine again and    Call 911 or go to the emergency room right away    See your regular doctor within 2  weeks of an Emergency Room or Urgent Care visit for follow-up treatment.          Annual Reminders:  Meet with Asthma Educator,  Flu Shot in the Fall, consider Pneumonia Vaccination for patients with asthma (aged 19 and older).    Pharmacy: [facility] #[id number]    Electronically signed by Cody Stephens MD   Date: 12/09/19                    Asthma Triggers  How To Control Things That Make Your Asthma Worse    Triggers are things that make your asthma worse.  Look at the list below to help you find your triggers and   what you can do about them. You can help prevent asthma flare-ups by staying away from your triggers.      Trigger                                                          What you can do   Cigarette Smoke  Tobacco smoke can make asthma worse. Do not allow smoking in your home, car or around you.  Be sure no one smokes at a child s day care or school.  If you smoke, ask your health care provider for ways to help you quit.  Ask family members to quit too.  Ask your health care provider for a referral to Quit Plan to help you quit smoking, or call 6-287-597-PLAN.     Colds, Flu, Bronchitis  These are common triggers of asthma. Wash your hands often.  Don t touch your eyes, nose or mouth.  Get a flu shot every year.     Dust Mites  These are tiny bugs that live in cloth or carpet. They are too small to see. Wash sheets and blankets in hot water every week.   Encase pillows and mattress in dust mite proof covers.  Avoid having carpet if you can. If you have carpet, vacuum weekly.   Use a dust mask and HEPA vacuum.   Pollen and Outdoor Mold  Some people are allergic to trees, grass, or weed pollen, or molds. Try to keep your windows closed.  Limit time out doors when pollen count is high.   Ask you health care provider about taking medicine during allergy season.     Animal Dander  Some people are allergic to skin flakes, urine or saliva from pets with fur or feathers. Keep pets with fur or feathers  out of your home.    If you can t keep the pet outdoors, then keep the pet out of your bedroom.  Keep the bedroom door closed.  Keep pets off cloth furniture and away from stuffed toys.     Mice, Rats, and Cockroaches  Some people are allergic to the waste from these pests.   Cover food and garbage.  Clean up spills and food crumbs.  Store grease in the refrigerator.   Keep food out of the bedroom.   Indoor Mold  This can be a trigger if your home has high moisture. Fix leaking faucets, pipes, or other sources of water.   Clean moldy surfaces.  Dehumidify basement if it is damp and smelly.   Smoke, Strong Odors, and Sprays  These can reduce air quality. Stay away from strong odors and sprays, such as perfume, powder, hair spray, paints, smoke incense, paint, cleaning products, candles and new carpet.   Exercise or Sports  Some people with asthma have this trigger. Be active!  Ask your doctor about taking medicine before sports or exercise to prevent symptoms.    Warm up for 5-10 minutes before and after sports or exercise.     Other Triggers of Asthma  Cold air:  Cover your nose and mouth with a scarf.  Sometimes laughing or crying can be a trigger.  Some medicines and food can trigger asthma.

## 2019-12-10 ASSESSMENT — ASTHMA QUESTIONNAIRES: ACT_TOTALSCORE: 23

## 2020-01-22 ENCOUNTER — OFFICE VISIT (OUTPATIENT)
Dept: DERMATOLOGY | Facility: CLINIC | Age: 40
End: 2020-01-22
Payer: COMMERCIAL

## 2020-01-22 VITALS — DIASTOLIC BLOOD PRESSURE: 69 MMHG | HEART RATE: 50 BPM | SYSTOLIC BLOOD PRESSURE: 120 MMHG

## 2020-01-22 DIAGNOSIS — L72.11 PILAR CYSTS: Primary | ICD-10-CM

## 2020-01-22 ASSESSMENT — PAIN SCALES - GENERAL: PAINLEVEL: NO PAIN (0)

## 2020-01-22 NOTE — PROGRESS NOTES
[facility] Dermatology Note      Dermatology Problem List:  1. 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 Meño Daniels is a 39 year old male who presents for evaluation of cysts. He reports that he has a couple raised lesions on his scalp which have been present for as long as he can remember. About ten years ago, one of these began growing in size, and it has now gotten large enough to cause him discomfort when he gets his hair cut. He had a similar lesion on his right shoulder which he states ruptured when he was playing hockey and was subsequently removed. He also notes that he has a similar-appearing raised lesion on his posterior neck which developed about 3 years ago. The patient voices no other concerns.    Past Medical History:   Patient Active Problem List   Diagnosis     CARDIOVASCULAR SCREENING; LDL GOAL LESS THAN 160     Past Medical History:   Diagnosis Date     Asthma      Diarrhea      Plantar fasciitis      Rectal bleeding      Past Surgical History:   Procedure Laterality Date     COLONOSCOPY N/A 7/9/2019    Procedure: Colonoscopy with random biopsies;  Surgeon: Stephan Hackett MD;  [facility]: [facility]     wisdom teeth         Social History:  Patient reports that he has never smoked. He has never used smokeless tobacco. He reports current alcohol use. He reports that he does not use drugs.    Family History:  Family History   Problem Relation Age of Onset     Colon Cancer Paternal Grandfather      Cerebrovascular Disease Maternal Grandmother      Cardiovascular Maternal Grandfather         open heart surgery        Medications:  Current Outpatient Medications   Medication Sig Dispense Refill     albuterol (PROAIR HFA/PROVENTIL HFA/VENTOLIN HFA) 108 (90 Base) MCG/ACT inhaler  Inhale 2 puffs into the lungs every 6 hours as needed for shortness of breath / dyspnea or wheezing 6.7 g 0     Vitamin D, Cholecalciferol, 25 MCG (1000 UT) CAPS          No Known Allergies    Review of Systems:  -Constitutional: Otherwise feeling well today, in usual state of health.  -HEENT: Patient denies nonhealing oral sores.  -Skin: As above in HPI. No additional skin concerns.    Physical exam:  Vitals: /69 (BP Location: Right arm, Patient Position: Sitting, Cuff Size: Adult Regular)   Pulse 50   GEN: This is a well developed, well-nourished male in no acute distress, in a pleasant mood.    SKIN: Focused examination of the head and neck was performed.  -Florez skin type: II  -There is a raised dome shaped 1.2x1.5cm nodule with a central punctum located on posterior crown of scalp  -There is a raised dome shaped 2.2x2.6 cm nodule with a central punctum located on L lateral crown of the scalp.  -No other lesions of concern on areas examined.       Impression/Plan:  1. Pilar cysts on scalp and epidermoid cyst on the right shoulder  - We discussed the natural etiology of the condition as well as the risks, benefits, and efficacy of treatment via excision   - the patient is agreeable to this, and he will follow up at a later date for excision  -Photos taken today for clinical surveillance   -Shoulder epidermoid cyst is not currently active    CC Cody Stephens MD  [address] [place] [address] on close of this encounter.  Follow-up prn for new or changing lesions.       Staff Involved:  Scribe/Staff    Scribe Disclosure  I, Dominic Najjar, am serving as a scribe to document services personally performed by Dr. Chi Maurer MD, based on data collection and the provider's statements to me.    Provider Disclosure:   The documentation recorded by the scribe accurately reflects the services I personally performed and the decisions made by me.    Chi Maurer MD    of Dermatology  Department of Dermatology  [facility]

## 2020-01-22 NOTE — NURSING NOTE
Dermatology Rooming Note    Meño Daniels's goals for this visit include:   Chief Complaint   Patient presents with     Derm Problem     Meño is here today for a cyst on his scalp     Bertha Murphy, CMA

## 2020-01-22 NOTE — LETTER
1/22/2020       RE: Meño Daniels  [address]  [address]  [facility] [address]     Dear Colleague,    Thank you for referring your patient, Meño Daniels, to the [facility] DERMATOLOGY at [facility]. Please see a copy of my visit note below.    [facility] Dermatology Note      Dermatology Problem List:  1. 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 Meño Daniels is a 39 year old male who presents for evaluation of cysts. He reports that he has a couple raised lesions on his scalp which have been present for as long as he can remember. About ten years ago, one of these began growing in size, and it has now gotten large enough to cause him discomfort when he gets his hair cut. He had a similar lesion on his right shoulder which he states ruptured when he was playing hockey and was subsequently removed. He also notes that he has a similar-appearing raised lesion on his posterior neck which developed about 3 years ago. The patient voices no other concerns.    Past Medical History:   Patient Active Problem List   Diagnosis     CARDIOVASCULAR SCREENING; LDL GOAL LESS THAN 160     Past Medical History:   Diagnosis Date     Asthma      Diarrhea      Plantar fasciitis      Rectal bleeding      Past Surgical History:   Procedure Laterality Date     COLONOSCOPY N/A 7/9/2019    Procedure: Colonoscopy with random biopsies;  Surgeon: Stephan Hackett MD;  Location: [facility]     wisdom teeth         Social History:  Patient reports that he has never smoked. He has never used smokeless tobacco. He reports current alcohol use. He reports that he does not use drugs.    Family History:  Family History   Problem Relation Age of Onset     Colon Cancer Paternal  Grandfather      Cerebrovascular Disease Maternal Grandmother      Cardiovascular Maternal Grandfather         open heart surgery        Medications:  Current Outpatient Medications   Medication Sig Dispense Refill     albuterol (PROAIR HFA/PROVENTIL HFA/VENTOLIN HFA) 108 (90 Base) MCG/ACT inhaler Inhale 2 puffs into the lungs every 6 hours as needed for shortness of breath / dyspnea or wheezing 6.7 g 0     Vitamin D, Cholecalciferol, 25 MCG (1000 UT) CAPS          No Known Allergies    Review of Systems:  -Constitutional: Otherwise feeling well today, in usual state of health.  -HEENT: Patient denies nonhealing oral sores.  -Skin: As above in HPI. No additional skin concerns.    Physical exam:  Vitals: /69 (BP Location: Right arm, Patient Position: Sitting, Cuff Size: Adult Regular)   Pulse 50   GEN: This is a well developed, well-nourished male in no acute distress, in a pleasant mood.    SKIN: Focused examination of the head and neck was performed.  -Florez skin type: II  -There is a raised dome shaped 1.2x1.5cm nodule with a central punctum located on posterior crown of scalp  -There is a raised dome shaped 2.2x2.6 cm nodule with a central punctum located on L lateral crown of the scalp.  -No other lesions of concern on areas examined.       Impression/Plan:  1. Pilar cysts on scalp and epidermoid cyst on the right shoulder  - We discussed the natural etiology of the condition as well as the risks, benefits, and efficacy of treatment via excision   - the patient is agreeable to this, and he will follow up at a later date for excision  -Photos taken today for clinical surveillance   -Shoulder epidermoid cyst is not currently active    CC Cody Stephens MD  [address] [place] [address] on close of this encounter.  Follow-up prn for new or changing lesions.       Staff Involved:  Scribe/Staff    Scribe Disclosure  I, Dominic Najjar, am serving as a scribe to document services  personally performed by Dr. Chi Maurer MD, based on data collection and the provider's statements to me.    Provider Disclosure:   The documentation recorded by the scribe accurately reflects the services I personally performed and the decisions made by me.    Chi Maurer MD   of Dermatology  Department of Dermatology  [facility]

## 2020-03-09 ENCOUNTER — OFFICE VISIT (OUTPATIENT)
Dept: DERMATOLOGY | Facility: CLINIC | Age: 40
End: 2020-03-09
Payer: COMMERCIAL

## 2020-03-09 VITALS — HEART RATE: 58 BPM | DIASTOLIC BLOOD PRESSURE: 80 MMHG | SYSTOLIC BLOOD PRESSURE: 131 MMHG

## 2020-03-09 DIAGNOSIS — L72.11 PILAR CYSTS: Primary | ICD-10-CM

## 2020-03-09 ASSESSMENT — PAIN SCALES - GENERAL
PAINLEVEL: NO PAIN (0)
PAINLEVEL: NO PAIN (0)

## 2020-03-09 NOTE — PROGRESS NOTES
DERMATOLOGIC EXCISION SURGERY PROCEDURE NOTE   Dermatology Problem List:  1. 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 Pilar cyst  2. Pilar cyst  POST-OPERATIVE DIAGNOSIS: Same   LOCATION:   1. Posterior crown of scalp  2. Left lateral crown of scalp  PRE-OPERATIVE SIZE:   1. 1.3x1.4 cm  2. 2.7x3.2 cm  MARGIN:   1. 0 cm  2. 0 cm  FINAL REPAIR LENGTH:   1. 1.5 cm   2. 2.6 cm  ANESTHESIA: 1% lidocaine with 1:100,000 epinephrine    INDICATIONS: This patient presented with a 1.3x1.4 cm pilar cyst on the posterior crown of the scalp and a 2.7x3.2 cm pilar cyst on the left lateral crown of the scalp. Excision was indicated.   We discussed the principles of treatment and most likely complications including bleeding, infection, scarring, alteration in skin color and sensation, wound dehiscence,muscle weakness in the area, or recurrence of the lesion or disease. We reviewed that on occasion, after healing, a secondary procedure or revision may be recommended in order to obtain the best cosmetic or functional result.   PROCEDURE: The patient was taken to the operative suite. Time-out was performed. The treatment areas were anesthetized. The areas were washed with chlorhexidine, rinsed with saline and draped with sterile towels. The lesion was delineated and excised down to deep subcutaneous fat in a fusiform manner. Hemostasis was obtained by electrocoagulation.   REPAIR:   1. Posterior crown of scalp:  An intermediate layered linear closure was selected as the procedure which would maximally preserve both function and cosmesis and for the following reasons: 1) the defect was widely undermined; 2) multiple deep plication and/or layered sutures placed; 3) wound size, depth, tension, and  location.   After the excision of the tumor, the area was extensively and carefully undermined. Hemostasis was obtained with spot electrocautery and ligation of vessels where necessary. An initial deep plication sutures of 3-0 Vicryl sutures were placed in the deep, subcutaneous and fascial planes to appose the lateral margins. The subcutaneous and dermal layers were then closed with additional 3-0 Vicryl sutures. The epidermis was then carefully approximated along the length of the wound using staples.   2. Left lateral crown of scalp:  An intermediate layered linear closure was selected as the procedure which would maximally preserve both function and cosmesis and for the following reasons: 1) the defect was widely undermined; 2) multiple deep plication and/or layered sutures placed; 3) wound size, depth, tension, and location.   After the excision of the tumor, the area was extensively and carefully undermined. Hemostasis was obtained with spot electrocautery and ligation of vessels where necessary. An initial deep plication sutures of 3-0 Vicryl sutures were placed in the deep, subcutaneous and fascial planes to appose the lateral margins. The subcutaneous and dermal layers were then closed with additional 3-0 Vicryl sutures. The epidermis was then carefully approximated along the length of the wound using staples.     Estimated blood loss was less than 10 ml for all surgical sites. A sterile pressure dressing was applied and wound care instructions, with a written handout, were given. The patient was discharged from dermatology clinic alert and ambulatory.    FU for staple removal in 2 weeks.      Staff Involved:  Staff Only

## 2020-03-09 NOTE — LETTER
3/9/2020       RE: Meño Daniels  [address]  [address]  [facility] [address]     Dear Colleague,    Thank you for referring your patient, Meño Daniels, to the [facility] DERMATOLOGY at [facility]. Please see a copy of my visit note below.    DERMATOLOGIC EXCISION SURGERY PROCEDURE NOTE   Dermatology Problem List:  1. 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 Pilar cyst  2. Pilar cyst  POST-OPERATIVE DIAGNOSIS: Same   LOCATION:   1. Posterior crown of scalp  2. Left lateral crown of scalp  PRE-OPERATIVE SIZE:   1. 1.3x1.4 cm  2. 2.7x3.2 cm  MARGIN:   1. 0 cm  2. 0 cm  FINAL REPAIR LENGTH:   1. 1.5 cm   2. 2.6 cm  ANESTHESIA: 1% lidocaine with 1:100,000 epinephrine    INDICATIONS: This patient presented with a 1.3x1.4 cm pilar cyst on the posterior crown of the scalp and a 2.7x3.2 cm pilar cyst on the left lateral crown of the scalp. Excision was indicated.   We discussed the principles of treatment and most likely complications including bleeding, infection, scarring, alteration in skin color and sensation, wound dehiscence,muscle weakness in the area, or recurrence of the lesion or disease. We reviewed that on occasion, after healing, a secondary procedure or revision may be recommended in order to obtain the best cosmetic or functional result.   PROCEDURE: The patient was taken to the operative suite. Time-out was performed. The treatment areas were anesthetized. The areas were washed with chlorhexidine, rinsed with saline and draped with sterile towels. The lesion was delineated and excised down to deep subcutaneous fat in a fusiform manner. Hemostasis was obtained by electrocoagulation.   REPAIR:   1. Posterior crown of  scalp:  An intermediate layered linear closure was selected as the procedure which would maximally preserve both function and cosmesis and for the following reasons: 1) the defect was widely undermined; 2) multiple deep plication and/or layered sutures placed; 3) wound size, depth, tension, and location.   After the excision of the tumor, the area was extensively and carefully undermined. Hemostasis was obtained with spot electrocautery and ligation of vessels where necessary. An initial deep plication sutures of 3-0 Vicryl sutures were placed in the deep, subcutaneous and fascial planes to appose the lateral margins. The subcutaneous and dermal layers were then closed with additional 3-0 Vicryl sutures. The epidermis was then carefully approximated along the length of the wound using staples.   2. Left lateral crown of scalp:  An intermediate layered linear closure was selected as the procedure which would maximally preserve both function and cosmesis and for the following reasons: 1) the defect was widely undermined; 2) multiple deep plication and/or layered sutures placed; 3) wound size, depth, tension, and location.   After the excision of the tumor, the area was extensively and carefully undermined. Hemostasis was obtained with spot electrocautery and ligation of vessels where necessary. An initial deep plication sutures of 3-0 Vicryl sutures were placed in the deep, subcutaneous and fascial planes to appose the lateral margins. The subcutaneous and dermal layers were then closed with additional 3-0 Vicryl sutures. The epidermis was then carefully approximated along the length of the wound using staples.     Estimated blood loss was less than 10 ml for all surgical sites. A sterile pressure dressing was applied and wound care instructions, with a written handout, were given. The patient was discharged from dermatology clinic alert and ambulatory.    FU for staple removal in 2 weeks.      Staff Involved:  Staff  Only        Again, thank you for allowing me to participate in the care of your patient.      Sincerely,    Chi Maurer MD

## 2020-03-09 NOTE — PATIENT INSTRUCTIONS

## 2020-03-09 NOTE — NURSING NOTE
Chief Complaint   Patient presents with     Derm Problem     Meño is here today for an excision on the scalp for pilar cysts x2.     MITCH PERALTA on 3/9/2020 at 8:21 AM

## 2020-03-10 LAB — COPATH REPORT: NORMAL

## 2020-03-23 ENCOUNTER — ALLIED HEALTH/NURSE VISIT (OUTPATIENT)
Dept: DERMATOLOGY | Facility: CLINIC | Age: 40
End: 2020-03-23
Payer: COMMERCIAL

## 2020-03-23 DIAGNOSIS — Z48.02 REMOVAL OF STAPLES: Primary | ICD-10-CM

## 2020-03-23 NOTE — NURSING NOTE
Meño Daniels comes into clinic today at the request of Dr. Maurer Ordering Provider for Staple removal.    This service provided today was under the supervising provider of the day Dr. Maurer, who was available if needed.    Hailee Aaron LPN

## 2020-03-23 NOTE — PATIENT INSTRUCTIONS
"Patient Education     Stitches or Staple Removal  You were seen today for removal of your stitches or staples. Your wound is healing as expected. The wound has healed well enough that the stitches or staples can be removed. The wound will continue to heal for the next few months.  At this time there is no sign of infection.   Home care    If you have pain, take pain medicine as advised by your healthcare provider.     Keep your wound clean and protected by covering it with a bandage for the next week or so.     Wash your hands with soap and warm water before and after caring for your wound. This helps prevent infection.    Clean the wound gently with soap and warm water daily or as directed by your healthcare provider. Don't use iodine, alcohol, or other cleansers on the wound.  Gently pat it dry. Put on a new bandage, if needed. Don't reuse bandages.    If the area gets wet, gently pat it dry with a clean cloth. Replace the wet bandage with a dry one.    Check the wound daily for signs of infection. (These are listed under \"When to seek medical advice\" below.)    You may shower and bathe as usual. Swimming may be allowed, but check with your healthcare provider first.    Keep the wound out of prolonged direct sunlight. The new skin is very sensitive and can easily sunburn causing worse scarring.    Ask your provider about using over-the-counter scar removing creams if your wound is highly visible.    Follow-up care  Follow up with your healthcare provider as advised.  When to seek medical advice   Call your healthcare provider if any of the following occur:    Wound reopens or bleeds    Signs of an infection, such as:  ? Increasing redness or swelling around the wound  ? Increased warmth from the wound  ? Worsening pain  ? Red streaking lines away from the wound  ? Fluid draining from the wound    Fever of 100.4 F (38 C) or higher, or as directed by your healthcare provider  Date Last Reviewed: 4/1/2018    " 4272-7823 [employer]. [address] [place] [address], [place] [address]. All rights reserved. This information is not intended as a substitute for professional medical care. Always follow your healthcare professional's instructions.

## 2020-11-29 ENCOUNTER — HEALTH MAINTENANCE LETTER (OUTPATIENT)
Age: 40
End: 2020-11-29

## 2021-07-18 ENCOUNTER — APPOINTMENT (OUTPATIENT)
Dept: CT IMAGING | Facility: CLINIC | Age: 41
End: 2021-07-18
Attending: EMERGENCY MEDICINE
Payer: COMMERCIAL

## 2021-07-18 ENCOUNTER — HOSPITAL ENCOUNTER (EMERGENCY)
Facility: CLINIC | Age: 41
Discharge: HOME | End: 2021-07-18
Attending: EMERGENCY MEDICINE | Admitting: EMERGENCY MEDICINE
Payer: COMMERCIAL

## 2021-07-18 VITALS
DIASTOLIC BLOOD PRESSURE: 86 MMHG | HEART RATE: 75 BPM | SYSTOLIC BLOOD PRESSURE: 130 MMHG | BODY MASS INDEX: 26.67 KG/M2 | HEIGHT: 70 IN | WEIGHT: 186.29 LBS | OXYGEN SATURATION: 97 % | RESPIRATION RATE: 20 BRPM | TEMPERATURE: 98.4 F

## 2021-07-18 DIAGNOSIS — L03.211 FACIAL CELLULITIS: ICD-10-CM

## 2021-07-18 DIAGNOSIS — Z11.52 ENCOUNTER FOR SCREENING LABORATORY TESTING FOR SEVERE ACUTE RESPIRATORY SYNDROME CORONAVIRUS 2 (SARS-COV-2): ICD-10-CM

## 2021-07-18 LAB
ALBUMIN SERPL-MCNC: 3.9 G/DL (ref 3.4–5)
ALBUMIN UR-MCNC: NEGATIVE MG/DL
ALP SERPL-CCNC: 59 U/L (ref 40–150)
ALT SERPL W P-5'-P-CCNC: 40 U/L (ref 0–70)
ANION GAP SERPL CALCULATED.3IONS-SCNC: 9 MMOL/L (ref 3–14)
APPEARANCE UR: CLEAR
AST SERPL W P-5'-P-CCNC: 38 U/L (ref 0–45)
BASOPHILS # BLD AUTO: 0 10E3/UL (ref 0–0.2)
BASOPHILS NFR BLD AUTO: 0 %
BILIRUB SERPL-MCNC: 1.5 MG/DL (ref 0.2–1.3)
BILIRUB UR QL STRIP: NEGATIVE
BUN SERPL-MCNC: 8 MG/DL (ref 7–30)
CALCIUM SERPL-MCNC: 8.8 MG/DL (ref 8.5–10.1)
CHLORIDE BLD-SCNC: 94 MMOL/L (ref 94–109)
CO2 SERPL-SCNC: 26 MMOL/L (ref 20–32)
COLOR UR AUTO: ABNORMAL
CREAT BLD-MCNC: 0.4 MG/DL (ref 0.7–1.3)
CREAT SERPL-MCNC: 0.94 MG/DL (ref 0.66–1.25)
EOSINOPHIL # BLD AUTO: 0 10E3/UL (ref 0–0.7)
EOSINOPHIL NFR BLD AUTO: 0 %
ERYTHROCYTE [DISTWIDTH] IN BLOOD BY AUTOMATED COUNT: 12.2 % (ref 10–15)
GFR SERPL CREATININE-BSD FRML MDRD: >60 ML/MIN/1.73M2
GFR SERPL CREATININE-BSD FRML MDRD: >90 ML/MIN/1.73M2
GLUCOSE BLD-MCNC: 90 MG/DL (ref 70–99)
GLUCOSE UR STRIP-MCNC: NEGATIVE MG/DL
HCT VFR BLD AUTO: 42.5 % (ref 40–53)
HGB BLD-MCNC: 14.8 G/DL (ref 13.3–17.7)
HGB UR QL STRIP: ABNORMAL
IMM GRANULOCYTES # BLD: 0 10E3/UL
IMM GRANULOCYTES NFR BLD: 0 %
KETONES UR STRIP-MCNC: 40 MG/DL
LEUKOCYTE ESTERASE UR QL STRIP: NEGATIVE
LYMPHOCYTES # BLD AUTO: 0.3 10E3/UL (ref 0.8–5.3)
LYMPHOCYTES NFR BLD AUTO: 5 %
MCH RBC QN AUTO: 29.1 PG (ref 26.5–33)
MCHC RBC AUTO-ENTMCNC: 34.8 G/DL (ref 31.5–36.5)
MCV RBC AUTO: 84 FL (ref 78–100)
MONOCYTES # BLD AUTO: 0.5 10E3/UL (ref 0–1.3)
MONOCYTES NFR BLD AUTO: 7 %
NEUTROPHILS # BLD AUTO: 5.9 10E3/UL (ref 1.6–8.3)
NEUTROPHILS NFR BLD AUTO: 88 %
NITRATE UR QL: NEGATIVE
NRBC # BLD AUTO: 0 10E3/UL
NRBC BLD AUTO-RTO: 0 /100
PH UR STRIP: 7.5 [PH] (ref 5–7)
PLATELET # BLD AUTO: 131 10E3/UL (ref 150–450)
POTASSIUM BLD-SCNC: 3.4 MMOL/L (ref 3.4–5.3)
PROT SERPL-MCNC: 7.1 G/DL (ref 6.8–8.8)
RBC # BLD AUTO: 5.08 10E6/UL (ref 4.4–5.9)
RBC URINE: 1 /HPF
SARS-COV-2 RNA RESP QL NAA+PROBE: NEGATIVE
SODIUM SERPL-SCNC: 129 MMOL/L (ref 133–144)
SP GR UR STRIP: 1 (ref 1–1.03)
UROBILINOGEN UR STRIP-MCNC: NORMAL MG/DL
WBC # BLD AUTO: 6.8 10E3/UL (ref 4–11)
WBC URINE: <1 /HPF

## 2021-07-18 PROCEDURE — 70491 CT SOFT TISSUE NECK W/DYE: CPT

## 2021-07-18 PROCEDURE — 96366 THER/PROPH/DIAG IV INF ADDON: CPT

## 2021-07-18 PROCEDURE — 82040 ASSAY OF SERUM ALBUMIN: CPT | Performed by: EMERGENCY MEDICINE

## 2021-07-18 PROCEDURE — 99285 EMERGENCY DEPT VISIT HI MDM: CPT | Mod: 25

## 2021-07-18 PROCEDURE — 87040 BLOOD CULTURE FOR BACTERIA: CPT | Performed by: EMERGENCY MEDICINE

## 2021-07-18 PROCEDURE — 36415 COLL VENOUS BLD VENIPUNCTURE: CPT | Performed by: EMERGENCY MEDICINE

## 2021-07-18 PROCEDURE — 96365 THER/PROPH/DIAG IV INF INIT: CPT

## 2021-07-18 PROCEDURE — 250N000011 HC RX IP 250 OP 636: Performed by: EMERGENCY MEDICINE

## 2021-07-18 PROCEDURE — 250N000013 HC RX MED GY IP 250 OP 250 PS 637: Performed by: EMERGENCY MEDICINE

## 2021-07-18 PROCEDURE — 70491 CT SOFT TISSUE NECK W/DYE: CPT | Mod: 26 | Performed by: RADIOLOGY

## 2021-07-18 PROCEDURE — C9803 HOPD COVID-19 SPEC COLLECT: HCPCS

## 2021-07-18 PROCEDURE — U0003 INFECTIOUS AGENT DETECTION BY NUCLEIC ACID (DNA OR RNA); SEVERE ACUTE RESPIRATORY SYNDROME CORONAVIRUS 2 (SARS-COV-2) (CORONAVIRUS DISEASE [COVID-19]), AMPLIFIED PROBE TECHNIQUE, MAKING USE OF HIGH THROUGHPUT TECHNOLOGIES AS DESCRIBED BY CMS-2020-01-R: HCPCS | Performed by: EMERGENCY MEDICINE

## 2021-07-18 PROCEDURE — 81001 URINALYSIS AUTO W/SCOPE: CPT | Performed by: EMERGENCY MEDICINE

## 2021-07-18 PROCEDURE — 82565 ASSAY OF CREATININE: CPT

## 2021-07-18 PROCEDURE — 85025 COMPLETE CBC W/AUTO DIFF WBC: CPT | Performed by: EMERGENCY MEDICINE

## 2021-07-18 PROCEDURE — 258N000003 HC RX IP 258 OP 636: Performed by: EMERGENCY MEDICINE

## 2021-07-18 PROCEDURE — 99285 EMERGENCY DEPT VISIT HI MDM: CPT | Performed by: EMERGENCY MEDICINE

## 2021-07-18 PROCEDURE — 96361 HYDRATE IV INFUSION ADD-ON: CPT

## 2021-07-18 PROCEDURE — 36592 COLLECT BLOOD FROM PICC: CPT | Performed by: EMERGENCY MEDICINE

## 2021-07-18 RX ORDER — ACETAMINOPHEN 500 MG
1000 TABLET ORAL ONCE
Status: COMPLETED | OUTPATIENT
Start: 2021-07-18 | End: 2021-07-18

## 2021-07-18 RX ORDER — SULFAMETHOXAZOLE/TRIMETHOPRIM 800-160 MG
2 TABLET ORAL EVERY 12 HOURS
Qty: 28 TABLET | Refills: 0 | Status: SHIPPED | OUTPATIENT
Start: 2021-07-18 | End: 2021-07-25

## 2021-07-18 RX ORDER — IOPAMIDOL 755 MG/ML
100 INJECTION, SOLUTION INTRAVASCULAR ONCE
Status: COMPLETED | OUTPATIENT
Start: 2021-07-18 | End: 2021-07-18

## 2021-07-18 RX ADMIN — SODIUM CHLORIDE 1000 ML: 9 INJECTION, SOLUTION INTRAVENOUS at 10:51

## 2021-07-18 RX ADMIN — VANCOMYCIN HYDROCHLORIDE 1250 MG: 100 INJECTION, POWDER, LYOPHILIZED, FOR SOLUTION INTRAVENOUS at 15:07

## 2021-07-18 RX ADMIN — ACETAMINOPHEN 1000 MG: 325 TABLET, FILM COATED ORAL at 10:52

## 2021-07-18 RX ADMIN — IOPAMIDOL 100 ML: 755 INJECTION, SOLUTION INTRAVENOUS at 11:45

## 2021-07-18 ASSESSMENT — ENCOUNTER SYMPTOMS
WEAKNESS: 0
FEVER: 1
CONFUSION: 0
UNEXPECTED WEIGHT CHANGE: 0
DIARRHEA: 1
TROUBLE SWALLOWING: 0
SORE THROAT: 0
NECK PAIN: 1
CHILLS: 1
APPETITE CHANGE: 0
FATIGUE: 0
VOMITING: 1
COUGH: 0
HEADACHES: 0
BLOOD IN STOOL: 0
ABDOMINAL PAIN: 0
EYE PAIN: 0
ACTIVITY CHANGE: 0
SINUS PRESSURE: 0
EYE REDNESS: 0
PALPITATIONS: 0
BRUISES/BLEEDS EASILY: 0
FLANK PAIN: 0
ADENOPATHY: 1
CHEST TIGHTNESS: 0
ARTHRALGIAS: 0
MYALGIAS: 1
EYE DISCHARGE: 0
RHINORRHEA: 0
LIGHT-HEADEDNESS: 0
NECK STIFFNESS: 0
DIFFICULTY URINATING: 0
BACK PAIN: 0
DYSURIA: 0
POLYDIPSIA: 0
SHORTNESS OF BREATH: 0
COLOR CHANGE: 0
NAUSEA: 1
PHOTOPHOBIA: 0

## 2021-07-18 ASSESSMENT — MIFFLIN-ST. JEOR: SCORE: 1756.25

## 2021-07-18 NOTE — ED PROVIDER NOTES
ED Provider Note  [facility]      History   No chief complaint on file.    HPI  Meño Daniels is a 41 year old male with a PMH of asthma, neck abscess and plantar fasciitis who presents the ED today complaining of neck pain, fever, nausea and vomiting.  Patient states that 3 days ago at around 5:30 PM he developed left-sided neck tightness which was also tender to the touch.  He states he then went to bed and woke up with severe chills.  He states that he has had low temps during the day of 99.6  F and temps of 102.9  F at night for the past 3 days.  He states has been staying hydrated with water and Pedialyte and eating toast.  Additionally patient endorses bile-like diarrhea as well as 2 episodes of bile-like emesis with his last episode occurring 2 days ago.  No abdominal pain, chest pain, or shortness of breath.  Patient states the pain was mild, achy, nonradiating, and constant on his left side of the neck and left temporal head but has now transitioned to bilateral sides of his neck.  He denies headache, visual disturbance, and photophobia.  He states he was using Advil every 4 hours and Tylenol every 6 hours, with his last use at 1:30 AM this morning.  Patient has that he had a pilar cyst removed on the top of his head two years ago, however, the site has never fully healed.  He still has a scab at that site.  He believes that either showering or potentially wearing his bicycle helmet might irritate the area and cause rather frequent abrasions, disruption of the scab, rescabbing, etc.  He reports the pain seems to be concentrated around that area.  Patient states he is otherwise healthy and does not take any daily medications.  Additionally he states he is never had Covid-19 infection, but denies being vaccinated.  Additionally patient denies recent sick contacts, rash, tobacco use, cough, rhinorrhea or congestion.    Past Medical History  Past Medical History:   Diagnosis Date      Asthma      Diarrhea      Plantar fasciitis      Rectal bleeding      Past Surgical History:   Procedure Laterality Date     COLONOSCOPY N/A 7/9/2019    Procedure: Colonoscopy with random biopsies;  Surgeon: Stephan Hakcett MD;  Location: [facility] OR     wisdom teeth       sulfamethoxazole-trimethoprim (BACTRIM DS) 800-160 MG tablet  albuterol (PROAIR HFA/PROVENTIL HFA/VENTOLIN HFA) 108 (90 Base) MCG/ACT inhaler  Vitamin D, Cholecalciferol, 25 MCG (1000 UT) CAPS      No Known Allergies  Family History  Family History   Problem Relation Age of Onset     Colon Cancer Paternal Grandfather      Cerebrovascular Disease Maternal Grandmother      Cardiovascular Maternal Grandfather         open heart surgery      Social History   Social History     Tobacco Use     Smoking status: Never Smoker     Smokeless tobacco: Never Used   Substance Use Topics     Alcohol use: Yes     Comment: social     Drug use: No      Past medical history, past surgical history, medications, allergies, family history, and social history were reviewed with the patient. No additional pertinent items.       Review of Systems   Constitutional: Positive for chills and fever. Negative for activity change, appetite change, fatigue and unexpected weight change.   HENT: Negative for congestion, dental problem, ear pain, postnasal drip, rhinorrhea, sinus pressure, sore throat and trouble swallowing.    Eyes: Negative for photophobia, pain, discharge, redness and visual disturbance.   Respiratory: Negative for cough, chest tightness and shortness of breath.    Cardiovascular: Negative for chest pain, palpitations and leg swelling.   Gastrointestinal: Positive for diarrhea, nausea and vomiting. Negative for abdominal pain and blood in stool.   Endocrine: Negative for polydipsia and polyuria.   Genitourinary: Negative for difficulty urinating, dysuria and flank pain.   Musculoskeletal: Positive for myalgias and neck pain ( bilateral). Negative for  "arthralgias, back pain, gait problem and neck stiffness.   Skin: Negative for color change and rash.   Allergic/Immunologic: Negative for immunocompromised state.   Neurological: Negative for weakness, light-headedness and headaches.   Hematological: Positive for adenopathy. Does not bruise/bleed easily.   Psychiatric/Behavioral: Negative for confusion.   All other systems reviewed and are negative.    A complete review of systems was performed with pertinent positives and negatives noted in the HPI, and all other systems negative.    Physical Exam   BP: 124/77  Pulse: 79  Temp: 98.5  F (36.9  C)  Resp: 16  Height: 177.8 cm (5' 10\")  Weight: 84.5 kg (186 lb 4.6 oz)  SpO2: 100 %  Physical Exam  Vitals and nursing note reviewed.   Constitutional:       General: He is not in acute distress.     Appearance: Normal appearance. He is normal weight. He is not ill-appearing, toxic-appearing or diaphoretic.      Comments: Well-appearing, well-groomed man, appears his stated age and in no acute distress, sitting in a brightly lit room.   HENT:      Head: Normocephalic.      Comments: Scab and abrasion on scalp without edema or fluctuance.  No obvious erythema or induration surrounding the affected site.     Right Ear: Tympanic membrane, ear canal and external ear normal.      Left Ear: Tympanic membrane, ear canal and external ear normal.      Nose: Nose normal. No congestion or rhinorrhea.      Mouth/Throat:      Mouth: Mucous membranes are moist.      Pharynx: Oropharynx is clear. No oropharyngeal exudate or posterior oropharyngeal erythema.      Comments: No lesions in oropharynx.  Airways patent.  No tonsillar swelling.  No elevation of the floor of the mouth.  Eyes:      General: No scleral icterus.     Extraocular Movements: Extraocular movements intact.      Conjunctiva/sclera: Conjunctivae normal.      Pupils: Pupils are equal, round, and reactive to light.      Comments: No evidence of photophobia on examination. "   Neck:      Vascular: No carotid bruit.      Meningeal: Brudzinski's sign and Kernig's sign absent.      Comments: Bilateral, submandibular, mild lymphadenopathy with associated tenderness.  No other areas of neck with point tenderness.  Full range of motion, active and passive, in neck without pain.  There is no meningismus.  Cardiovascular:      Rate and Rhythm: Normal rate.      Pulses: Normal pulses.      Heart sounds: Normal heart sounds. No murmur heard.     Pulmonary:      Effort: Pulmonary effort is normal. No respiratory distress.      Breath sounds: Normal breath sounds. No stridor. No wheezing, rhonchi or rales.   Abdominal:      General: Abdomen is flat.      Palpations: Abdomen is soft.      Tenderness: There is no abdominal tenderness. There is no right CVA tenderness, left CVA tenderness, guarding or rebound.   Musculoskeletal:         General: Normal range of motion.      Cervical back: Normal range of motion. Tenderness present. No rigidity.   Lymphadenopathy:      Cervical: Cervical adenopathy present.   Skin:     General: Skin is warm.      Capillary Refill: Capillary refill takes less than 2 seconds.      Coloration: Skin is not jaundiced.      Findings: No erythema, lesion or rash.   Neurological:      General: No focal deficit present.      Mental Status: He is alert and oriented to person, place, and time.      Cranial Nerves: No cranial nerve deficit.      Coordination: Coordination normal.   Psychiatric:         Mood and Affect: Mood normal.         Behavior: Behavior normal.         Thought Content: Thought content normal.         Judgment: Judgment normal.         ED Course     9:54 AM  The patient was seen and examined by Vonda Montano MD in Room ED16.     Procedures            Results for orders placed or performed during the hospital encounter of 07/18/21   Soft tissue neck CT w contrast     Status: None    Narrative    CT SOFT TISSUE NECK W CONTRAST 7/18/2021 11:53 AM    Additional  information obtained from EMR: 41 year old male?with a PMH  of asthma,?neck abscess and plantar fasciitis?who?presents the ED  today complaining of neck pain, fever,?nausea?and vomiting.??Patient  states that 3 days ago he developed left-sided neck tightness which  was also tender to the touch  History:  Lymphadenopathy, neck; evaluate for infectious process      Comparison:  None available     Technique: Following intravenous administration of nonionic iodinated  contrast medium, thin section helical CT images were obtained from the  skull base down to the level of the aortic arch.  Axial, coronal and  sagittal reformations were performed with 2-3 mm slice thickness  reconstruction. Images were reviewed in soft tissue, lung and bone  windows.    Contrast: 100 cc Isovue-370     Findings:   Evaluation of the mucosal space demonstrates no evident abnormality in  the nasopharynx, oropharynx, hypopharynx or the glottis. The tongue  base appears normal. The major salivary glands appear unremarkable.  The thyroid gland appears normal.    There are several scattered bilateral level 2A, 2B and bilateral 5  lymph nodes. For example a left level 5 node measures 1.1 cm in longer  axial dimension, a right level IIb node measures 1.2 cm in longer  axial dimension. None of these demonstrate necrotic component. There  is no evidence of dental abscess. The fascial spaces in the neck are  intact bilaterally. The major vascular structures in the neck appear  unremarkable.    Evaluation of the osseous structures demonstrate no worrisome lytic or  sclerotic lesion. No overt spinal canal or neuroforaminal stenosis.  The visualized paranasal sinuses are clear. The mastoid air cells are  clear.     The visualized lung apices are clear. Incidentally noted azygos lobe.      Impression    Impression: Several bilateral level 2 and 5 lymph nodes, not enlarged  as per size criteria. These are likely reactive. Otherwise normal neck  CT.    I  have personally reviewed the examination and initial interpretation  and I agree with the findings.    HUMERA CHIANG MD         SYSTEM ID:  [id number]   Comprehensive metabolic panel     Status: Abnormal   Result Value Ref Range    Sodium 129 (L) 133 - 144 mmol/L    Potassium 3.4 3.4 - 5.3 mmol/L    Chloride 94 94 - 109 mmol/L    Carbon Dioxide (CO2) 26 20 - 32 mmol/L    Anion Gap 9 3 - 14 mmol/L    Urea Nitrogen 8 7 - 30 mg/dL    Creatinine 0.94 0.66 - 1.25 mg/dL    Calcium 8.8 8.5 - 10.1 mg/dL    Glucose 90 70 - 99 mg/dL    Alkaline Phosphatase 59 40 - 150 U/L    AST 38 0 - 45 U/L    ALT 40 0 - 70 U/L    Protein Total 7.1 6.8 - 8.8 g/dL    Albumin 3.9 3.4 - 5.0 g/dL    Bilirubin Total 1.5 (H) 0.2 - 1.3 mg/dL    GFR Estimate >90 >60 mL/min/1.73m2   UA with Microscopic reflex to Culture     Status: Abnormal    Specimen: Urine, Midstream   Result Value Ref Range    Color Urine Light Yellow Colorless, Straw, Light Yellow, Yellow    Appearance Urine Clear Clear    Glucose Urine Negative Negative mg/dL    Bilirubin Urine Negative Negative    Ketones Urine 40  (A) Negative mg/dL    Specific Gravity Urine 1.004 1.003 - 1.035    Blood Urine Trace (A) Negative    pH Urine 7.5 (H) 5.0 - 7.0    Protein Albumin Urine Negative Negative mg/dL    Urobilinogen Urine Normal Normal, 2.0 mg/dL    Nitrite Urine Negative Negative    Leukocyte Esterase Urine Negative Negative    RBC Urine 1 <=2 /HPF    WBC Urine <1 <=5 /HPF    Narrative    Urine Culture not indicated   CBC with platelets and differential     Status: Abnormal   Result Value Ref Range    WBC Count 6.8 4.0 - 11.0 10e3/uL    RBC Count 5.08 4.40 - 5.90 10e6/uL    Hemoglobin 14.8 13.3 - 17.7 g/dL    Hematocrit 42.5 40.0 - 53.0 %    MCV 84 78 - 100 fL    MCH 29.1 26.5 - 33.0 pg    MCHC 34.8 31.5 - 36.5 g/dL    RDW 12.2 10.0 - 15.0 %    Platelet Count 131 (L) 150 - 450 10e3/uL    % Neutrophils 88 %    % Lymphocytes 5 %    % Monocytes 7 %    % Eosinophils 0 %    % Basophils 0 %     % Immature Granulocytes 0 %    NRBCs per 100 WBC 0 <1 /100    Absolute Neutrophils 5.9 1.6 - 8.3 10e3/uL    Absolute Lymphocytes 0.3 (L) 0.8 - 5.3 10e3/uL    Absolute Monocytes 0.5 0.0 - 1.3 10e3/uL    Absolute Eosinophils 0.0 0.0 - 0.7 10e3/uL    Absolute Basophils 0.0 0.0 - 0.2 10e3/uL    Absolute Immature Granulocytes 0.0 <=0.0 10e3/uL    Absolute NRBCs 0.0 10e3/uL   SARS-COV2 (COVID-19) Virus RT-PCR     Status: Normal    Specimen: Nasopharyngeal; Swab   Result Value Ref Range    SARS CoV2 PCR Negative Negative    Narrative    Testing was performed using the VSoftert Xpress SARS-CoV-2 Assay on the  [employer]. Additional information about  this Emergency Use Authorization (EUA) assay can be found via the Lab  Guide. This test should be ordered for the detection of SARS-CoV-2 in  individuals who meet SARS-CoV-2 clinical and/or epidemiological  criteria. Test performance is unknown in asymptomatic patients. This  test is for in vitro diagnostic use under the FDA EUA for  laboratories certified under CLIA to perform high complexity testing.  This test has not been FDA cleared or approved. A negative result  does not rule out the presence of PCR inhibitors in the specimen or  target RNA in concentration below the limit of detection for the  assay. The possibility of a false negative should be considered if  the patient's recent exposure or clinical presentation suggests  COVID-19. This test was validated by the [facility]. This laboratory is certified under  the Clinical Laboratory Improvement Amendments of 1988 (CLIA-88) as  qualified to perform high complexity laboratory testing.     Creatinine POCT     Status: Abnormal   Result Value Ref Range    Creatinine POCT 0.4 (L) 0.7 - 1.3 mg/dL    GFR, ESTIMATED POCT >60 >60 mL/min/1.73m2   Symptomatic COVID-19 Virus (Coronavirus) by PCR Nasopharyngeal     Status: Normal    Specimen: Nasopharyngeal;  Swab    Narrative    The following orders were created for panel order Symptomatic COVID-19 Virus (Coronavirus) by PCR Nasopharyngeal.  Procedure                               Abnormality         Status                     ---------                               -----------         ------                     SARS-COV2 (COVID-19) Vir...[[id number]]  Normal              Final result                 Please view results for these tests on the individual orders.   CBC with platelets differential     Status: Abnormal    Narrative    The following orders were created for panel order CBC with platelets differential.  Procedure                               Abnormality         Status                     ---------                               -----------         ------                     CBC with platelets and d...[[id number]]  Abnormal            Final result                 Please view results for these tests on the individual orders.     Medications   sodium chloride (PF) 0.9% PF flush 3 mL (has no administration in time range)   sodium chloride (PF) 0.9% PF flush 3 mL (has no administration in time range)   vancomycin 1250 mg in 0.9% NaCl 250 mL intermittent infusion 1,250 mg (1,250 mg Intravenous New Bag 7/18/21 1507)   vancomycin 1250 mg in 0.9% NaCl 250 mL intermittent infusion 1,250 mg (has no administration in time range)   0.9% sodium chloride BOLUS (0 mLs Intravenous Stopped 7/18/21 1210)   acetaminophen (TYLENOL) tablet 1,000 mg (1,000 mg Oral Given 7/18/21 1052)   iopamidol (ISOVUE-370) solution 100 mL (100 mLs Intravenous Given 7/18/21 1145)   sodium chloride (PF) 0.9% PF flush 60 mL (60 mLs Intravenous Given 7/18/21 1145)        Assessments & Plan (with Medical Decision Making)   41-year-old man presenting with fevers with associated nausea vomiting for several days.  Differential diagnosis: COVID-19 infection, viral illness, gastroenteritis, viral meningitis, unlikely bacterial meningitis.    After thorough  history and physical exam patient was to be no acute distress.  Initially, in triage he was afebrile, however, throughout his stay in the emergency department he did develop a fever of 100.2 from 102.3  F.  He will be given oral Tylenol for this.  The rest of his vital signs are within normal limits.  I will hydrate him with a bolus of intravenous normal saline and obtain laboratory studies along with COVID-19 test.  We will also do CT soft tissue/neck for further diagnostic evaluation given initial presentation of left-sided neck pain and subsequent bilateral submandibular lymphadenopathy.  I highly doubt acute bacterial meningitis given patient's clinical picture and I do not believe that lumbar puncture is warranted at this time.  He agrees with the plan.    Patient's laboratory studies returned without any evidence of leukocytosis, WBC is normal at 6800. There is no evidence of anemia, hemoglobin is normal at 14.8.  Electrolytes show no evidence of dehydration, creatinine is normal at 0.94.  LFTs are normal other than slightly elevated total bilirubin of 1.5.  COVID-19 test is negative.  I reviewed patient's CT soft tissue/neck and I read the radiology report; no evidence of retropharyngeal abscess, however, there is evidence of reactive lymphadenopathy.    On reevaluation I noticed that patient's cheeks and parts of his forehead have become more erythematous and indurated.  It appears that he has developed facial cellulitis throughout his emergency department stay.  I treated the patient with a dose of intravenous vancomycin.  He tolerated the antibiotic well.  His fever resolved throughout his stay.  He was comfortable and stable for discharge with outpatient primary care follow-up.  He was given prescription for Bactrim.  He agrees to return to the emergency department if his symptoms worsen despite taking the prescribed antibiotic..      I have reviewed the nursing notes. I have reviewed the findings,  diagnosis, plan and need for follow up with the patient.    New Prescriptions    SULFAMETHOXAZOLE-TRIMETHOPRIM (BACTRIM DS) 800-160 MG TABLET    Take 2 tablets by mouth every 12 hours for 7 days       Final diagnoses:   Facial cellulitis   Collins PLATT, am serving as a trained medical scribe to document services personally performed by Vonda Montano MD, MD, based on the provider's statements to me.     Dusty PLATT Nikola, MD, MD, was physically present and have reviewed and verified the accuracy of this note documented by Collins Appiah.      --  Vonda Montano MD  [facility] EMERGENCY DEPARTMENT  7/18/2021     Vonda Montano MD  07/18/21 0157

## 2021-07-18 NOTE — ED TRIAGE NOTES
Pt BIB wife for a fever, nausea, and neck pain.  Around 430 pm on Thursday neck became sore to the touch that extended up to his scalp.  He began experiencing chills around 11 pm on Thursday.  Fever was then at 102.  Pt has been medicating with tylenol q6 and ibuprofen q4.  Has been drinking Pedialyte to stay hydrated.  Pt complains of nausea with small amounts of vomiting at night.

## 2021-07-18 NOTE — PHARMACY-VANCOMYCIN DOSING SERVICE
Pharmacy Vancomycin Initial Note  Date of Service 2021  Patient's  1980  41 year old, male    Indication: Skin and Soft Tissue Infection    Current estimated CrCl = Estimated Creatinine Clearance: 290.5 mL/min (A) (based on SCr of 0.4 mg/dL (L)).    Creatinine for last 3 days  2021: 10:34 AM Creatinine 0.94 mg/dL; 11:06 AM Creatinine POCT 0.4 mg/dL    Recent Vancomycin Level(s) for last 3 days  No results found for requested labs within last 72 hours.      Vancomycin IV Administrations (past 72 hours)      No vancomycin orders with administrations in past 72 hours.                Nephrotoxins and other renal medications (From now, onward)    Start     Dose/Rate Route Frequency Ordered Stop    21 0330  vancomycin 1250 mg in 0.9% NaCl 250 mL intermittent infusion 1,250 mg      1,250 mg  over 90 Minutes Intravenous EVERY 12 HOURS 21 1429      21 1430  vancomycin 1250 mg in 0.9% NaCl 250 mL intermittent infusion 1,250 mg      1,250 mg  over 90 Minutes Intravenous ONCE 21 1428            Contrast Orders - past 72 hours (72h ago, onward)    Start     Dose/Rate Route Frequency Ordered Stop    21 1125  iopamidol (ISOVUE-370) solution 100 mL      100 mL Intravenous ONCE 21 1125 21 1145          Loading dose: N/A  Regimen: 1250 mg IV every 12 hours.  Start time: 14:29 on 2021  Exposure target: AUC24 (range)400-600 mg/L.hr   AUC24,ss: 459 mg/L.hr  Probability of AUC24 > 400: 64 %  Ctrough,ss: 13.4 mg/L  Probability of Ctrough,ss > 20: 21 %  Probability of nephrotoxicity (Lodise CHRISTIANNE ): 9 %          Plan:  1. Start vancomycin  1250 mg IV q12h.   2. Vancomycin monitoring method: AUC  3. Vancomycin therapeutic monitoring goal: 400-600 mg*h/L  4. Pharmacy will check vancomycin levels as appropriate in 1-3 Days.    5. Serum creatinine levels will be ordered daily for the first week of therapy and at least twice weekly for subsequent weeks.      Mahamed Noble,  PharmD, BCPS

## 2021-07-18 NOTE — DISCHARGE INSTRUCTIONS
Please make an appointment to follow up with Your Primary Care Provider in 2 days for re-evaluation.  Please take the prescribed medication as instructed.  You can take Tylenol or ibuprofen for fevers and/or pain.  If your symptoms are worsening despite taking the prescribed antibiotic for at least 24 hours please come back to the emergency department.

## 2021-07-18 NOTE — PHARMACY-VANCOMYCIN DOSING SERVICE
Pharmacy Vancomycin Initial Note  Date of Service 2021  Patient's  1980  41 year old, male    Indication: Skin and Soft Tissue Infection    Current estimated CrCl = CrCl cannot be calculated (Unknown ideal weight.).    Creatinine for last 3 days  2021: 10:34 AM Creatinine 0.94 mg/dL; 11:06 AM Creatinine POCT 0.4 mg/dL    Recent Vancomycin Level(s) for last 3 days  No results found for requested labs within last 72 hours.      Vancomycin IV Administrations (past 72 hours)      No vancomycin orders with administrations in past 72 hours.                Nephrotoxins and other renal medications (From now, onward)    None          Contrast Orders - past 72 hours (72h ago, onward)    Start     Dose/Rate Route Frequency Ordered Stop    21 1125  iopamidol (ISOVUE-370) solution 100 mL      100 mL Intravenous ONCE 21 1125 21 1145          Loading dose: N/A  Regimen: 1250 mg IV every 12 hours.  Start time: 14:33 on 2021  Exposure target: AUC24 (range)400-600 mg/L.hr   AUC24,ss: 530 mg/L.hr  Probability of AUC24 > 400: 78 %  Ctrough,ss: 16.3 mg/L  Probability of Ctrough,ss > 20: 33 %  Probability of nephrotoxicity (Lodise CHRISTIANNE ): 12 %        Plan:  1. Start vancomycin  1250 mg IVq 12 hour   2. Vancomycin monitoring method: AUC  3. Vancomycin therapeutic monitoring goal: 400-600 mg*h/L  4. Pharmacy will check vancomycin levels as appropriate in 1-3 Days.    5. Serum creatinine levels will be ordered daily for the first week of therapy and at least twice weekly for subsequent weeks.      Pradeep Rodriguez [facility]

## 2021-07-20 ENCOUNTER — NURSE TRIAGE (OUTPATIENT)
Dept: NURSING | Facility: CLINIC | Age: 41
End: 2021-07-20

## 2021-07-20 ENCOUNTER — HOSPITAL ENCOUNTER (EMERGENCY)
Facility: CLINIC | Age: 41
Discharge: HOME | End: 2021-07-20
Attending: INTERNAL MEDICINE | Admitting: INTERNAL MEDICINE
Payer: COMMERCIAL

## 2021-07-20 VITALS
RESPIRATION RATE: 14 BRPM | SYSTOLIC BLOOD PRESSURE: 114 MMHG | OXYGEN SATURATION: 100 % | HEART RATE: 62 BPM | DIASTOLIC BLOOD PRESSURE: 82 MMHG | TEMPERATURE: 98.1 F

## 2021-07-20 DIAGNOSIS — L03.211 FACIAL CELLULITIS: ICD-10-CM

## 2021-07-20 PROCEDURE — 99283 EMERGENCY DEPT VISIT LOW MDM: CPT | Performed by: INTERNAL MEDICINE

## 2021-07-20 PROCEDURE — 99284 EMERGENCY DEPT VISIT MOD MDM: CPT | Performed by: INTERNAL MEDICINE

## 2021-07-20 RX ORDER — CEPHALEXIN 500 MG/1
500 CAPSULE ORAL 4 TIMES DAILY
Qty: 40 CAPSULE | Refills: 0 | Status: SHIPPED | OUTPATIENT
Start: 2021-07-20 | End: 2021-07-30

## 2021-07-20 ASSESSMENT — ENCOUNTER SYMPTOMS
ARTHRALGIAS: 0
SHORTNESS OF BREATH: 0
HEADACHES: 0
CONFUSION: 0
FACIAL SWELLING: 1
NECK STIFFNESS: 0
FEVER: 0
DIFFICULTY URINATING: 0
EYE REDNESS: 0
ABDOMINAL PAIN: 0
COLOR CHANGE: 0

## 2021-07-20 NOTE — ED TRIAGE NOTES
Pt c/o increased facial swelling and redness (airway intact, no difficultly breathing).   Was in ER Sunday, given IV abx for Cellulitis on face and discharged w/ sulfamethoxazole. Redness is spreading to different areas of face and swelling increasing on right side of face/near eye    Denies vision changes or fevers since abx, and lessening pain.

## 2021-07-20 NOTE — TELEPHONE ENCOUNTER
In the ER and told to reach out to MD to touch base on everything. He goes to [facility].  I connected him with scheduling for a follow up appointment at [facility].  Isabella Alvarez RN  [facility]    Additional Information    Negative: Nursing judgment    Negative: Nursing judgment    Negative: Nursing judgment    Negative: Nursing judgment    Information only question and nurse able to answer    Protocols used: INFORMATION ONLY CALL - NO TRIAGE-A-OH

## 2021-07-20 NOTE — ED PROVIDER NOTES
[facility] EMERGENCY DEPARTMENT ([facility])  7/20/21  History     Chief Complaint   Patient presents with     Facial Swelling     The history is provided by the patient and medical records.     Meño Daniels is a 41 year old male who presents to the Emergency Department for evaluation of worsening right sided facial swelling since Sunday. He was recently seen in the ED and instructed to return if his symptoms worsen. He notes that he was prescribed Bactrim upon discharge and has been taking his medication as prescribed for 1.5 days.  He endorses worsening facial swelling underneath his right eye, right eyebrow, and along his right jaw.  He notes that the swelling moves around his face, particularly when he wakes up in the morning. He denies any fevers since Sunday. He has been taken Tylenol and Advil for management his symptoms in addition to his antibiotic.     Per chart review,  Patient was recently seen at [facility] on 7/18/2021 for evaluation of neck pain, fever, nausea, and vomiting.  He was hydrated with IV fluids and given oral Tylenol. Labs were unremarkable. CT soft tissue neck showed no evidence of retropharyngeal abscess, however, there was evidence of reactive lymphadenopathy.  On reevaluation, his cheeks and forehead were noted to have become more erythematous and indurated. He was treated with a dose of IV vancomycin.  His fever resolved throughout his stay.  He was given prescription for Bactrim.  He was instructed to return to the emergency department if his symptoms worsen despite taking the prescribed antibiotic.    I have reviewed the Medications, Allergies, Past Medical and Surgical History, and Social History in the Epic system.  PAST MEDICAL HISTORY:   Past Medical History:   Diagnosis Date     Asthma      Diarrhea      Plantar fasciitis      Rectal bleeding        PAST SURGICAL HISTORY:   Past Surgical History:   Procedure Laterality Date     COLONOSCOPY N/A 7/9/2019     Procedure: Colonoscopy with random biopsies;  Surgeon: Stephan Hackett MD;  Location: [facility] OR     wisdom teeth         Past medical history, past surgical history, medications, and allergies were reviewed with the patient. Additional pertinent items: None    FAMILY HISTORY:   Family History   Problem Relation Age of Onset     Colon Cancer Paternal Grandfather      Cerebrovascular Disease Maternal Grandmother      Cardiovascular Maternal Grandfather         open heart surgery        SOCIAL HISTORY:   Social History     Tobacco Use     Smoking status: Never Smoker     Smokeless tobacco: Never Used   Substance Use Topics     Alcohol use: Yes     Comment: social     Social history was reviewed with the patient. Additional pertinent items: None      Discharge Medication List as of 7/20/2021  4:39 PM      START taking these medications    Details   cephALEXin (KEFLEX) 500 MG capsule Take 1 capsule (500 mg) by mouth 4 times daily for 10 days, Disp-40 capsule, R-0, Local Print         CONTINUE these medications which have NOT CHANGED    Details   albuterol (PROAIR HFA/PROVENTIL HFA/VENTOLIN HFA) 108 (90 Base) MCG/ACT inhaler Inhale 2 puffs into the lungs every 6 hours as needed for shortness of breath / dyspnea or wheezing, Disp-6.7 g, R-0, Local PrintPharmacy may dispense brand covered by insurance (Proair, or proventil or ventolin or generic albuterol inhaler)      sulfamethoxazole-trimethoprim (BACTRIM DS) 800-160 MG tablet Take 2 tablets by mouth every 12 hours for 7 days, Disp-28 tablet, R-0, Local Print      Vitamin D, Cholecalciferol, 25 MCG (1000 UT) CAPS Historical              No Known Allergies     Review of Systems   Constitutional: Negative for fever.   HENT: Positive for facial swelling (R ). Negative for congestion.    Eyes: Negative for redness.   Respiratory: Negative for shortness of breath.    Cardiovascular: Negative for chest pain.   Gastrointestinal: Negative for abdominal pain.    Genitourinary: Negative for difficulty urinating.   Musculoskeletal: Negative for arthralgias and neck stiffness.   Skin: Negative for color change.   Neurological: Negative for headaches.   Psychiatric/Behavioral: Negative for confusion.   All other systems reviewed and are negative.    A complete review of systems was performed with pertinent positives and negatives noted in the HPI, and all other systems negative.    Physical Exam   BP: 116/83  Pulse: 63  Temp: 98.1  F (36.7  C)  Resp: 16  SpO2: 100 %      Physical Exam  Constitutional:       General: He is not in acute distress.     Appearance: He is not diaphoretic.   HENT:      Head: Atraumatic. No raccoon eyes, Hayes's sign, abrasion, contusion or masses.      Jaw: There is normal jaw occlusion.     Eyes:      General: No scleral icterus.     Pupils: Pupils are equal, round, and reactive to light.   Cardiovascular:      Heart sounds: Normal heart sounds.   Pulmonary:      Effort: No respiratory distress.      Breath sounds: Normal breath sounds.   Abdominal:      General: Bowel sounds are normal.      Palpations: Abdomen is soft.      Tenderness: There is no abdominal tenderness.   Musculoskeletal:         General: No tenderness.   Skin:     General: Skin is warm.      Findings: No rash.   Neurological:      General: No focal deficit present.      Cranial Nerves: No cranial nerve deficit.         ED Course     At 4:34 PM the patient was seen and examined by Erin Montano MD in Room [facility].        Procedures           No results found for this or any previous visit (from the past 24 hour(s)).  Medications - No data to display          Assessments & Plan (with Medical Decision Making)    Facial cellulitis not responding to bactrim very well, no fever or SOB or Dysphagia, will add keflex to have better strep coverage and follow up with his PMD. If any worsening of symptoms, to return to ED.         I have reviewed the nursing notes.    I have reviewed the  findings, diagnosis, plan and need for follow up with the patient.    Discharge Medication List as of 7/20/2021  4:39 PM      START taking these medications    Details   cephALEXin (KEFLEX) 500 MG capsule Take 1 capsule (500 mg) by mouth 4 times daily for 10 days, Disp-40 capsule, R-0, Local Print             Final diagnoses:   Facial cellulitis       Ina PALTT am serving as a trained medical scribe to document services personally performed by Erin Montano MD, based on the provider's statements to me.      Erin PLATT MD, was physically present and have reviewed and verified the accuracy of this note documented by Ina Zarate.     Erin Montano MD  7/20/2021   formerly [facility] EMERGENCY DEPARTMENT     Erin Montano MD  07/20/21 0213

## 2021-07-21 ENCOUNTER — OFFICE VISIT (OUTPATIENT)
Dept: FAMILY MEDICINE | Facility: CLINIC | Age: 41
End: 2021-07-21
Payer: COMMERCIAL

## 2021-07-21 VITALS
OXYGEN SATURATION: 99 % | HEART RATE: 55 BPM | DIASTOLIC BLOOD PRESSURE: 80 MMHG | TEMPERATURE: 97.3 F | RESPIRATION RATE: 14 BRPM | WEIGHT: 185 LBS | SYSTOLIC BLOOD PRESSURE: 119 MMHG | HEIGHT: 70 IN | BODY MASS INDEX: 26.48 KG/M2

## 2021-07-21 DIAGNOSIS — L03.211 CELLULITIS, FACE: Primary | ICD-10-CM

## 2021-07-21 LAB
ANION GAP SERPL CALCULATED.3IONS-SCNC: 5 MMOL/L (ref 3–14)
BASOPHILS # BLD AUTO: 0.1 10E3/UL (ref 0–0.2)
BASOPHILS NFR BLD AUTO: 2 %
BUN SERPL-MCNC: 10 MG/DL (ref 7–30)
CALCIUM SERPL-MCNC: 9.6 MG/DL (ref 8.5–10.1)
CHLORIDE BLD-SCNC: 105 MMOL/L (ref 94–109)
CO2 SERPL-SCNC: 29 MMOL/L (ref 20–32)
CREAT SERPL-MCNC: 1.1 MG/DL (ref 0.66–1.25)
EOSINOPHIL # BLD AUTO: 0.2 10E3/UL (ref 0–0.7)
EOSINOPHIL NFR BLD AUTO: 5 %
ERYTHROCYTE [DISTWIDTH] IN BLOOD BY AUTOMATED COUNT: 12.7 % (ref 10–15)
GFR SERPL CREATININE-BSD FRML MDRD: 83 ML/MIN/1.73M2
GLUCOSE BLD-MCNC: 94 MG/DL (ref 70–99)
HCT VFR BLD AUTO: 42.8 % (ref 40–53)
HGB BLD-MCNC: 14.2 G/DL (ref 13.3–17.7)
IMM GRANULOCYTES # BLD: 0.1 10E3/UL
IMM GRANULOCYTES NFR BLD: 2 %
LYMPHOCYTES # BLD AUTO: 1 10E3/UL (ref 0.8–5.3)
LYMPHOCYTES NFR BLD AUTO: 24 %
MCH RBC QN AUTO: 28.9 PG (ref 26.5–33)
MCHC RBC AUTO-ENTMCNC: 33.2 G/DL (ref 31.5–36.5)
MCV RBC AUTO: 87 FL (ref 78–100)
MONOCYTES # BLD AUTO: 0.7 10E3/UL (ref 0–1.3)
MONOCYTES NFR BLD AUTO: 16 %
NEUTROPHILS # BLD AUTO: 2.3 10E3/UL (ref 1.6–8.3)
NEUTROPHILS NFR BLD AUTO: 51 %
NRBC # BLD AUTO: 0 10E3/UL
NRBC BLD AUTO-RTO: 0 /100
PLATELET # BLD AUTO: 199 10E3/UL (ref 150–450)
POTASSIUM BLD-SCNC: 4.6 MMOL/L (ref 3.4–5.3)
RBC # BLD AUTO: 4.91 10E6/UL (ref 4.4–5.9)
RBC MORPH BLD: NORMAL
SODIUM SERPL-SCNC: 139 MMOL/L (ref 133–144)
WBC # BLD AUTO: 4.4 10E3/UL (ref 4–11)

## 2021-07-21 RX ORDER — MUPIROCIN 20 MG/G
OINTMENT TOPICAL 3 TIMES DAILY
Qty: 30 G | Refills: 0 | Status: SHIPPED | OUTPATIENT
Start: 2021-07-21 | End: 2023-10-26

## 2021-07-21 ASSESSMENT — MIFFLIN-ST. JEOR: SCORE: 1750.4

## 2021-07-21 NOTE — PATIENT INSTRUCTIONS
ASSESSMENT/PLAN:       1. Generally healthy 40 yo who has had 1 week of a facial cellulitis. Now on both Bactrim and Keflex and improving. No fevers for about 3 days. Had previous labs showing mild hyponatremia and mild thrombocytopenia   - Continue on both Bactrim (now day 4/7) and Keflex (now day 2/10).   -Given Rx for Mupirocin to apply to scaly areas, around RIGHT ear  -Also, suggested Tea Tree Oil soap washes  - If worse, return to clinic or go to ER  -Check Sodium and CBC today    2. Coronavirus vaccine issues  -Had long discussion about vaccination.   - Suggested calling your local pharmacy or going online for vaccine       --Cody Stephens MD

## 2021-07-21 NOTE — NURSING NOTE
"41 year old  Chief Complaint   Patient presents with     Hospital F/U     facial swelling, some swelling still there but has gone down some. Also wondering about which antibiotic he should be taking.       Blood pressure 119/80, pulse 55, temperature 97.3  F (36.3  C), resp. rate 14, height 1.778 m (5' 10\"), weight 83.9 kg (185 lb), SpO2 99 %. Body mass index is 26.54 kg/m .  Patient Active Problem List   Diagnosis     CARDIOVASCULAR SCREENING; LDL GOAL LESS THAN 160       Wt Readings from Last 2 Encounters:   07/21/21 83.9 kg (185 lb)   07/18/21 84.5 kg (186 lb 4.6 oz)     BP Readings from Last 3 Encounters:   07/21/21 119/80   07/20/21 114/82   07/18/21 130/86         Current Outpatient Medications   Medication     albuterol (PROAIR HFA/PROVENTIL HFA/VENTOLIN HFA) 108 (90 Base) MCG/ACT inhaler     cephALEXin (KEFLEX) 500 MG capsule     sulfamethoxazole-trimethoprim (BACTRIM DS) 800-160 MG tablet     Vitamin D, Cholecalciferol, 25 MCG (1000 UT) CAPS     No current facility-administered medications for this visit.       Social History     Tobacco Use     Smoking status: Never Smoker     Smokeless tobacco: Never Used   Substance Use Topics     Alcohol use: Yes     Comment: social     Drug use: No       Health Maintenance Due   Topic Date Due     ADVANCE CARE PLANNING  Never done     Pneumococcal Vaccine: Pediatrics (0 to 5 Years) and At-Risk Patients (6 to 64 Years) (1 of 2 - PPSV23) Never done     COVID-19 Vaccine (1) Never done     HEPATITIS C SCREENING  Never done     PREVENTIVE CARE VISIT  03/27/2020     ASTHMA CONTROL TEST  06/09/2020     ASTHMA ACTION PLAN  12/09/2020     PHQ-2  01/01/2021       No results found for: PAP      July 21, 2021 8:01 AM  "

## 2021-07-21 NOTE — PROGRESS NOTES
"OVERVIEW: Meño Daniels is a 41 year old male who presents to [facility] today for Hospital F/U (facial swelling, some swelling still there but has gone down some. Also wondering about which antibiotic he should be taking.)        ASSESSMENT/PLAN:       1. Generally healthy 42 yo who has had 1 week of a facial cellulitis. Now on both Bactrim and Keflex and improving. No fevers for about 3 days. Had previous labs showing mild hyponatremia and mild thrombocytopenia   - Continue on both Bactrim (now day 4/7) and Keflex (now day 2/10).   -Given Rx for Mupirocin to apply to scaly areas, around RIGHT ear  -Also, suggested Tea Tree Oil soap washes  - If worse, return to clinic or go to ER  -Check Sodium and CBC today    2. Coronavirus vaccine issues  -Had long discussion about vaccination for SARS-CoV-2. We discussed some of the controversies.  If interested in pursuing the vaccine, I suggested calling your local pharmacy or going online for vaccine     --Cody Stephens MD      SUBJECTIVE:     Meño Daniels is a 41 year old male with right sided facial swelling since July 15.   He was feeling well, but noticed some swellings on his neck and scalp (seems like lymphnodes). He then awoke with chills on July 16. Had Temp of 102. Decreased appetite. He then noticed some swelling on his forehead. The swelling progressed over his face. His temps went up and down. On July 18 went to ER.     After Rx of Bactrim, swelling on LEFT side of face cleared up but then he developed swelling on the Right side of his face and went back to E.R.      Per notes of ER yesterday, July 20:   \"Per chart review,  Patient was recently seen at [facility] on 7/18/2021 for evaluation of neck pain, fever, nausea, and vomiting.  He was hydrated with IV fluids and given oral Tylenol. Labs were unremarkable. CT soft tissue neck showed no evidence of retropharyngeal abscess, however, there was evidence of reactive " "lymphadenopathy.  On reevaluation, his cheeks and forehead were noted to have become more erythematous and indurated. He was treated with a dose of IV vancomycin.  His fever resolved throughout his stay.  He was given prescription for Bactrim.  He was instructed to return to the emergency department if his symptoms worsen despite taking the prescribed antibiotic.\"    Last temp was 2 days ago on July 19. It was a low fever at about 99.5 He's taking his temperature at home regularly.     He feels about 90% back to normal. Still notices some RIGHT sided facial swelling.     Review Of Systems:  No changes in vision. He does NOT wear contacts.    No respiratory symptoms.   Eating is now OK.   No chest pain or shortness of breath.   Urination is normal.     Of note, he has not been vaccinated to the coronavirus.     Problem list per EMR:  Patient Active Problem List   Diagnosis     CARDIOVASCULAR SCREENING; LDL GOAL LESS THAN 160       Current Outpatient Medications   Medication Sig Dispense Refill     albuterol (PROAIR HFA/PROVENTIL HFA/VENTOLIN HFA) 108 (90 Base) MCG/ACT inhaler Inhale 2 puffs into the lungs every 6 hours as needed for shortness of breath / dyspnea or wheezing (Patient not taking: Reported on 3/9/2020) 6.7 g 0     cephALEXin (KEFLEX) 500 MG capsule Take 1 capsule (500 mg) by mouth 4 times daily for 10 days 40 capsule 0     sulfamethoxazole-trimethoprim (BACTRIM DS) 800-160 MG tablet Take 2 tablets by mouth every 12 hours for 7 days 28 tablet 0     Vitamin D, Cholecalciferol, 25 MCG (1000 UT) CAPS          No Known Allergies     Social:   Unchanged:   Social History     Social History Narrative        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  He appears a physically fit 41-year-old in no distress but with a very slight bit of reddish tinged towards the right side of his face and near the right ear where the skin is a bit dry.  There is no pain with tugging on the ear and the tympanic membrane looks normal.  His neck is supple and I cannot feel any lymphadenopathy.  His eyes move in all directions.  No pain with palpation on the face.  Easily opens his jaw and has a clear oropharynx.    His cardiovascular exam is totally normal and he maintains an athletic heartbeat at 55 bpm.    His lungs are clear to auscultation.  His gait is normal.    SEE TOP OF NOTE FOR ASSESSMENT AND PLAN    30 minutes spent on the date of the encounter doing chart review, history and exam, documentation and further activities as noted in the note.      --Cody Stephens MD  [facility], Department of Family Medicine and Community Health   Discharged

## 2021-07-23 LAB
BACTERIA BLD CULT: NO GROWTH
BACTERIA BLD CULT: NO GROWTH

## 2021-09-25 ENCOUNTER — HEALTH MAINTENANCE LETTER (OUTPATIENT)
Age: 41
End: 2021-09-25

## 2021-10-06 NOTE — PATIENT INSTRUCTIONS
It was a pleasure taking care of you today.  I've included a brief summary of our discussion and care plan from today's visit below.  Please review this information with your primary care provider.  ______________________________________________________________________    My recommendations are summarized as follows:    -- labs     -- would recommend 2 week lactose-free trial. Can also look into low FODMAP diet. Could consider meeting with our dietitian     -- Soluble fiber supplementation on a daily basis can help regulate the consistency of your stools. Metamucil, citrucel or benefiber are all examples. You can start with 1 teaspoon per day and if tolerated, may increase up to 2 tablespoons per day. You may experience some bloating with initiation of fiber supplementation that will improve over the first month.  A good fiber trial to evaluate the effect is 3-6 months.    -- will obtain records from upper endoscopy     Return to GI Clinic pending response to above treatment  ______________________________________________________________________    Who do I call with any questions after my visit?  Please be in touch if there are any further questions that arise following today's visit.  There are multiple ways to contact your gastroenterology care team.        During business hours, you may reach a Gastroenterology nurse at [phone]      To schedule or reschedule an appointment, please call [phone].       You can always send a secure message through [employer].  [employer] messages are answered by your nurse or doctor typically within 24 hours.  Please allow extra time on weekends and holidays.        For urgent/emergent questions after business hours, you may reach the on-call GI Fellow by contacting the [facility] at [phone].     How will I get the results of any tests ordered?    You will receive all of your results.  If you have signed up for [employer], any tests ordered at your visit  will be available to you after your physician reviews them.  Typically this takes 1-2 weeks.  If there are urgent results that require a change in your care plan, your physician or nurse will call you to discuss the next steps.      What is [employer]?  [employer] is a secure way for you to access all of your healthcare records from the [facility].  It is a web based computer program, so you can sign on to it from any location.  It also allows you to send secure messages to your care team.  I recommend signing up for [employer] access if you have not already done so and are comfortable with using a computer.      How to I schedule a follow-up visit?  If you did not schedule a follow-up visit today, please call [phone] to schedule a follow-up office visit.        Sincerely,    Gauri Shepherd PA-C  Division of Gastroenterology, Hepatology & Nutrition  [facility]       75.3

## 2022-01-15 ENCOUNTER — HEALTH MAINTENANCE LETTER (OUTPATIENT)
Age: 42
End: 2022-01-15

## 2022-12-26 ENCOUNTER — HEALTH MAINTENANCE LETTER (OUTPATIENT)
Age: 42
End: 2022-12-26

## 2023-04-16 ENCOUNTER — HEALTH MAINTENANCE LETTER (OUTPATIENT)
Age: 43
End: 2023-04-16

## 2023-05-09 ENCOUNTER — ALLIED HEALTH/NURSE VISIT (OUTPATIENT)
Dept: RESEARCH | Facility: CLINIC | Age: 43
End: 2023-05-09

## 2023-05-09 DIAGNOSIS — Z00.6 EXAMINATION OF PARTICIPANT IN CLINICAL TRIAL: Primary | ICD-10-CM

## 2023-05-09 NOTE — PROGRESS NOTES
Informed Consent Process Documentation  Study Name and Protocol Number:    Effects of colorectal surgery on the colonic microbiome   [id number]    ICF Version Date / IRB Effective Date: Version 2.0 / IRB Effective Date: 1/4/2023    Date of Approach/Consent: 05/02/2023    The subject was screened and meets all of the inclusion criteria and none of the exclusion criteria is met.      The subject was told:  -that the study involves research   -the purpose of the research study  -the expected duration of the study and the approximate number of subject sought  -of procedures that are identified as experimental  -of reasonably foreseeable risks or discomforts to the subject  -of any benefits to the subject or others that may be expected from the research  -of alternative procedures and/or treatment  -how the confidentiality of records would be maintained  -whether or not compensation and medical treatments are available should injury occur as a result of the study  -who to contact if they have questions related to the research study or questions regarding research subjects' rights  -that participation is completely voluntary and that their decision to or not to participate will have no impact on their relationships with the [facility] and the staff    No study procedures were completed prior to the consent being obtained.  The use of historical information (lab or assessments) used for the purpose of the study was approved by subject.  The subject was fully aware that we would be reviewing their medical record for the study.    The subject demonstrated an understanding of what the study involved.  Specifically, how this study differed from standard of care at our center and what was required of the subject as part of the study.    The subject reviewed the consent form and was given the opportunity to ask questions before signing.  Questions and concerns were answered by the study staff and/or study physician.    A copy of the  signed informed consent document was provided to the subject.  [x] Yes  [] No     The subject was offered a copy of the signed informed consent but declined. [] Yes [x] No     Questions to Evaluate Subject Comprehension of Study:     Question: Adequate Response? If No, explain what actions were taken   What is being studied? [x] Yes  [] No   If you participate, what will be different than if you decide not to participate?  [x] Yes  [] No   How long will the study last; will you be required to return for visits? [x] Yes  [] No   What kinds of risks are there? [x] Yes  [] No         Sign: Sherry Carson

## 2023-10-25 ENCOUNTER — MYC MEDICAL ADVICE (OUTPATIENT)
Dept: FAMILY MEDICINE | Facility: CLINIC | Age: 43
End: 2023-10-25

## 2023-10-26 ENCOUNTER — VIRTUAL VISIT (OUTPATIENT)
Dept: FAMILY MEDICINE | Facility: CLINIC | Age: 43
End: 2023-10-26
Payer: COMMERCIAL

## 2023-10-26 ENCOUNTER — TELEPHONE (OUTPATIENT)
Dept: ORTHOPEDICS | Facility: CLINIC | Age: 43
End: 2023-10-26

## 2023-10-26 ENCOUNTER — TELEPHONE (OUTPATIENT)
Dept: FAMILY MEDICINE | Facility: CLINIC | Age: 43
End: 2023-10-26

## 2023-10-26 DIAGNOSIS — S22.009A CLOSED FRACTURE OF THORACIC VERTEBRAL BODY (H): Primary | ICD-10-CM

## 2023-10-26 DIAGNOSIS — S32.009A CLOSED FRACTURE OF LUMBAR VERTEBRAL BODY (H): ICD-10-CM

## 2023-10-26 DIAGNOSIS — S22.20XD CLOSED FRACTURE OF STERNUM WITH ROUTINE HEALING, UNSPECIFIED PORTION OF STERNUM, SUBSEQUENT ENCOUNTER: ICD-10-CM

## 2023-10-26 PROCEDURE — 99213 OFFICE O/P EST LOW 20 MIN: CPT | Mod: 93 | Performed by: FAMILY MEDICINE

## 2023-10-26 NOTE — CONFIDENTIAL NOTE
See phone message from call center.  [facility] called pt & made appt for next week. Nelida Bernabe RN.
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
## 2023-10-26 NOTE — TELEPHONE ENCOUNTER
[facility] Call Center    Phone Message    May a detailed message be left on voicemail: yes     Reason for Call: Other: Patient was referred for Closed fracture of thoracic vertebral body, Closed fracture of lumbar vertebral body. Please call patient to schedule.       Action Taken: Other: [id number]    Travel Screening: Not Applicable

## 2023-10-27 NOTE — TELEPHONE ENCOUNTER
Action October 26, 2023 9:45 PM MT   Action Taken Sent a request for imaging from .       DIAGNOSIS:   Closed fracture of thoracic vertebral body (H) [S22.009A]  - Primary  Closed fracture of lumbar vertebral body (H) [S32.009A]   APPOINTMENT DATE: 10/31/2023   NOTES STATUS DETAILS   OFFICE NOTE from referring provider Internal 10/26/2023 - Chi Ndiaye DO - [facility]   OFFICE NOTE from other specialist Internal 10/10/2023 -  Urgent Care   CT SCAN PACS Internal:  07/18/2021 - Neck  07/12/2009 - Chest    HP:  10/24/2023 - Chest     XRAYS (IMAGES & REPORTS) PACS Internal:  07/30/2012 - Chest    HP:  10/24/2023, 09/18/2013 - Chest   10/10/2023-  T Spine

## 2023-10-30 DIAGNOSIS — M48.50XA VERTEBRAL COMPRESSION FRACTURE (H): Primary | ICD-10-CM

## 2023-10-31 ENCOUNTER — ANCILLARY PROCEDURE (OUTPATIENT)
Dept: GENERAL RADIOLOGY | Facility: CLINIC | Age: 43
End: 2023-10-31
Attending: ORTHOPAEDIC SURGERY
Payer: COMMERCIAL

## 2023-10-31 ENCOUNTER — OFFICE VISIT (OUTPATIENT)
Dept: ORTHOPEDICS | Facility: CLINIC | Age: 43
End: 2023-10-31
Payer: COMMERCIAL

## 2023-10-31 ENCOUNTER — PRE VISIT (OUTPATIENT)
Dept: ORTHOPEDICS | Facility: CLINIC | Age: 43
End: 2023-10-31

## 2023-10-31 DIAGNOSIS — S22.000D COMPRESSION FRACTURE OF THORACIC VERTEBRA WITH ROUTINE HEALING, UNSPECIFIED THORACIC VERTEBRAL LEVEL, SUBSEQUENT ENCOUNTER: Primary | ICD-10-CM

## 2023-10-31 DIAGNOSIS — M48.50XA VERTEBRAL COMPRESSION FRACTURE (H): ICD-10-CM

## 2023-10-31 PROCEDURE — 72082 X-RAY EXAM ENTIRE SPI 2/3 VW: CPT | Performed by: STUDENT IN AN ORGANIZED HEALTH CARE EDUCATION/TRAINING PROGRAM

## 2023-10-31 PROCEDURE — 77073 BONE LENGTH STUDIES: CPT | Performed by: STUDENT IN AN ORGANIZED HEALTH CARE EDUCATION/TRAINING PROGRAM

## 2023-10-31 PROCEDURE — 99203 OFFICE O/P NEW LOW 30 MIN: CPT | Mod: GC | Performed by: ORTHOPAEDIC SURGERY
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
## 2023-11-15 ENCOUNTER — OFFICE VISIT (OUTPATIENT)
Dept: FAMILY MEDICINE | Facility: CLINIC | Age: 43
End: 2023-11-15

## 2023-11-15 VITALS
BODY MASS INDEX: 27.12 KG/M2 | WEIGHT: 189 LBS | DIASTOLIC BLOOD PRESSURE: 72 MMHG | RESPIRATION RATE: 17 BRPM | SYSTOLIC BLOOD PRESSURE: 114 MMHG | OXYGEN SATURATION: 98 % | TEMPERATURE: 97.2 F | HEART RATE: 63 BPM

## 2023-11-15 DIAGNOSIS — L65.9 HAIR LOSS: ICD-10-CM

## 2023-11-15 DIAGNOSIS — R20.0 NUMBNESS: Primary | ICD-10-CM

## 2023-11-15 LAB
ERYTHROCYTE [DISTWIDTH] IN BLOOD BY AUTOMATED COUNT: 12.4 % (ref 10–15)
FERRITIN SERPL-MCNC: 181 NG/ML (ref 31–409)
HCT VFR BLD AUTO: 47.5 % (ref 40–53)
HGB BLD-MCNC: 15.5 G/DL (ref 13.3–17.7)
MCH RBC QN AUTO: 28.7 PG (ref 26.5–33)
MCHC RBC AUTO-ENTMCNC: 32.6 G/DL (ref 31.5–36.5)
MCV RBC AUTO: 88 FL (ref 78–100)
PLATELET # BLD AUTO: 165 10E3/UL (ref 150–450)
RBC # BLD AUTO: 5.41 10E6/UL (ref 4.4–5.9)
TSH SERPL DL<=0.005 MIU/L-ACNC: 1.09 UIU/ML (ref 0.3–4.2)
WBC # BLD AUTO: 5.1 10E3/UL (ref 4–11)

## 2023-11-15 PROCEDURE — 82728 ASSAY OF FERRITIN: CPT | Performed by: FAMILY MEDICINE

## 2023-11-15 PROCEDURE — 84443 ASSAY THYROID STIM HORMONE: CPT | Performed by: FAMILY MEDICINE
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
## 2023-11-15 NOTE — PATIENT INSTRUCTIONS
-  ASSESSMENT and PLAN:    - Tingling on tip of tongue  Unclear etiology. Will put in referral to Neurology. Meño informed that they are months behind  Also, suggested dentistry evaluation as it's been over a year      - Hair loss  Check TSH, CBC and Ferritin levels.   Will proceed accordingly. May benefit by referral to Dermatology if it persists      Cody Stephens MD  Family Medicine and Sports Medicine  [facility]

## 2023-11-21 DIAGNOSIS — S22.000D COMPRESSION FRACTURE OF THORACIC VERTEBRA WITH ROUTINE HEALING, UNSPECIFIED THORACIC VERTEBRAL LEVEL, SUBSEQUENT ENCOUNTER: Primary | ICD-10-CM

## 2023-11-21 DIAGNOSIS — M89.8X1 PERISCAPULAR PAIN: ICD-10-CM

## 2023-11-24 ENCOUNTER — THERAPY VISIT (OUTPATIENT)
Dept: PHYSICAL THERAPY | Facility: CLINIC | Age: 43
End: 2023-11-24
Attending: ORTHOPAEDIC SURGERY
Payer: COMMERCIAL

## 2023-11-24 DIAGNOSIS — M54.6 BILATERAL THORACIC BACK PAIN: Primary | ICD-10-CM

## 2023-11-24 DIAGNOSIS — S22.000D COMPRESSION FRACTURE OF THORACIC VERTEBRA WITH ROUTINE HEALING, UNSPECIFIED THORACIC VERTEBRAL LEVEL, SUBSEQUENT ENCOUNTER: ICD-10-CM

## 2023-11-24 DIAGNOSIS — M89.8X1 PERISCAPULAR PAIN: ICD-10-CM

## 2023-11-24 PROCEDURE — 97530 THERAPEUTIC ACTIVITIES: CPT | Mod: GP

## 2023-11-24 PROCEDURE — 97161 PT EVAL LOW COMPLEX 20 MIN: CPT | Mod: GP

## 2023-11-24 PROCEDURE — 97110 THERAPEUTIC EXERCISES: CPT | Mod: GP
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
## 2023-11-28 ENCOUNTER — THERAPY VISIT (OUTPATIENT)
Dept: PHYSICAL THERAPY | Facility: CLINIC | Age: 43
End: 2023-11-28
Payer: COMMERCIAL

## 2023-11-28 DIAGNOSIS — M54.6 BILATERAL THORACIC BACK PAIN: Primary | ICD-10-CM

## 2023-11-28 PROCEDURE — 97112 NEUROMUSCULAR REEDUCATION: CPT | Mod: GP | Performed by: PHYSICAL THERAPIST

## 2023-11-28 PROCEDURE — 97530 THERAPEUTIC ACTIVITIES: CPT | Mod: GP | Performed by: PHYSICAL THERAPIST

## 2023-11-28 PROCEDURE — 97110 THERAPEUTIC EXERCISES: CPT | Mod: GP | Performed by: PHYSICAL THERAPIST

## 2023-12-04 ENCOUNTER — THERAPY VISIT (OUTPATIENT)
Dept: PHYSICAL THERAPY | Facility: CLINIC | Age: 43
End: 2023-12-04
Payer: COMMERCIAL

## 2023-12-04 DIAGNOSIS — M54.6 BILATERAL THORACIC BACK PAIN: Primary | ICD-10-CM

## 2023-12-04 PROCEDURE — 97110 THERAPEUTIC EXERCISES: CPT | Mod: GP | Performed by: PHYSICAL THERAPIST

## 2023-12-04 PROCEDURE — 97140 MANUAL THERAPY 1/> REGIONS: CPT | Mod: GP | Performed by: PHYSICAL THERAPIST

## 2023-12-18 ENCOUNTER — THERAPY VISIT (OUTPATIENT)
Dept: PHYSICAL THERAPY | Facility: CLINIC | Age: 43
End: 2023-12-18
Payer: COMMERCIAL

## 2023-12-18 DIAGNOSIS — M54.6 BILATERAL THORACIC BACK PAIN: Primary | ICD-10-CM

## 2023-12-18 PROCEDURE — 97110 THERAPEUTIC EXERCISES: CPT | Mod: GP | Performed by: PHYSICAL THERAPIST

## 2023-12-18 PROCEDURE — 97140 MANUAL THERAPY 1/> REGIONS: CPT | Mod: GP | Performed by: PHYSICAL THERAPIST

## 2023-12-18 PROCEDURE — 97112 NEUROMUSCULAR REEDUCATION: CPT | Mod: GP | Performed by: PHYSICAL THERAPIST

## 2023-12-19 ENCOUNTER — MYC MEDICAL ADVICE (OUTPATIENT)
Dept: PHYSICAL THERAPY | Facility: CLINIC | Age: 43
End: 2023-12-19

## 2024-01-15 ENCOUNTER — THERAPY VISIT (OUTPATIENT)
Dept: PHYSICAL THERAPY | Facility: CLINIC | Age: 44
End: 2024-01-15
Payer: COMMERCIAL

## 2024-01-15 DIAGNOSIS — M54.6 BILATERAL THORACIC BACK PAIN: Primary | ICD-10-CM

## 2024-01-15 PROCEDURE — 97530 THERAPEUTIC ACTIVITIES: CPT | Mod: GP | Performed by: PHYSICAL THERAPIST

## 2024-01-15 PROCEDURE — 97110 THERAPEUTIC EXERCISES: CPT | Mod: GP | Performed by: PHYSICAL THERAPIST

## 2024-02-05 ENCOUNTER — MYC MEDICAL ADVICE (OUTPATIENT)
Dept: FAMILY MEDICINE | Facility: CLINIC | Age: 44
End: 2024-02-05

## 2024-02-05 DIAGNOSIS — Z30.09 ENCOUNTER FOR VASECTOMY COUNSELING: Primary | ICD-10-CM

## 2024-02-27 NOTE — TELEPHONE ENCOUNTER
MEDICAL RECORDS REQUEST   [facility]  Urology Clinic  [address] [place] [address]  PHONE: [phone]  Fax: [phone]        FUTURE VISIT INFORMATION                                                   Meño Daniels, : 1980 scheduled for future visit at [facility]    APPOINTMENT INFORMATION:  Date: 2024  Provider:  Graeme Pearce MD  Reason for Visit/Diagnosis: VASECTOMY CONSULT    REFERRAL INFORMATION:  Referring provider:  Cody Stephens MD in [facility] PRIMARY CARE      RECORDS REQUESTED FOR VISIT                                                     NOTES  STATUS/DETAILS   NOTE from referring provider  yes   MEDICATION LIST  yes     PRE-VISIT CHECKLIST      Joint diagnostic appointment coordinated correctly          (ensure right order & amount of time) Yes   RECORD COLLECTION COMPLETE Yes

## 2024-03-26 ENCOUNTER — PRE VISIT (OUTPATIENT)
Dept: UROLOGY | Facility: CLINIC | Age: 44
End: 2024-03-26
Payer: COMMERCIAL

## 2024-03-26 NOTE — TELEPHONE ENCOUNTER
Reason for visit: vasectomy consult    Records/imaging/labs/orders: in epic    Insurance:  Does patient carry state insurance?  No  Examples of state insurance:  Medicaid - this is straight Medical Assistance  [facility] - [place] product through [employer] [facility] - [place] product through [facility] PMAP  [employer] PMAP  [employer] [place]  [employer] PMAP  [facility] PMAP    If YES (Consent for Sterilization must be completed by patient and provider)    At Rooming: if in-person, please have pt complete consent for sterilization - consent is in Ramses folder      Lbian Cole  03/26/24  10:07 AM

## 2024-03-27 ENCOUNTER — PRE VISIT (OUTPATIENT)
Dept: NEUROLOGY | Facility: CLINIC | Age: 44
End: 2024-03-27

## 2024-03-27 ENCOUNTER — OFFICE VISIT (OUTPATIENT)
Dept: NEUROLOGY | Facility: CLINIC | Age: 44
End: 2024-03-27
Attending: FAMILY MEDICINE
Payer: COMMERCIAL

## 2024-03-27 VITALS
HEART RATE: 51 BPM | WEIGHT: 195.1 LBS | DIASTOLIC BLOOD PRESSURE: 69 MMHG | BODY MASS INDEX: 27.99 KG/M2 | OXYGEN SATURATION: 93 % | SYSTOLIC BLOOD PRESSURE: 121 MMHG

## 2024-03-27 DIAGNOSIS — R44.8 PARESTHESIA OF TONGUE: Primary | ICD-10-CM

## 2024-03-27 PROCEDURE — 99204 OFFICE O/P NEW MOD 45 MIN: CPT | Performed by: PSYCHIATRY & NEUROLOGY

## 2024-03-27 ASSESSMENT — PAIN SCALES - GENERAL: PAINLEVEL: NO PAIN (0)
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
## 2024-04-25 ENCOUNTER — PRE VISIT (OUTPATIENT)
Dept: UROLOGY | Facility: CLINIC | Age: 44
End: 2024-04-25

## 2024-04-25 ENCOUNTER — OFFICE VISIT (OUTPATIENT)
Dept: UROLOGY | Facility: CLINIC | Age: 44
End: 2024-04-25
Attending: FAMILY MEDICINE
Payer: COMMERCIAL

## 2024-04-25 VITALS
WEIGHT: 192 LBS | BODY MASS INDEX: 27.49 KG/M2 | HEIGHT: 70 IN | SYSTOLIC BLOOD PRESSURE: 116 MMHG | DIASTOLIC BLOOD PRESSURE: 78 MMHG | OXYGEN SATURATION: 97 % | HEART RATE: 49 BPM

## 2024-04-25 DIAGNOSIS — Z30.09 ENCOUNTER FOR VASECTOMY COUNSELING: ICD-10-CM

## 2024-04-25 DIAGNOSIS — N44.1 CYST OF TUNICA ALBUGINEA TESTIS: Primary | ICD-10-CM

## 2024-04-25 PROCEDURE — 99203 OFFICE O/P NEW LOW 30 MIN: CPT | Performed by: UROLOGY

## 2024-04-25 ASSESSMENT — PAIN SCALES - GENERAL: PAINLEVEL: NO PAIN (0)

## 2024-04-25 NOTE — PATIENT INSTRUCTIONS
Can you please schedule a vasectomy as well as a scrotal ultrasound with  please?    Thank you!  Liban Galvan    It was a pleasure meeting with you today.  Thank you for allowing me and my team the privilege of caring for you today.  YOU are the reason we are here, and I truly hope we provided you with the excellent service you deserve.  Please let us know if there is anything else we can do for you so that we can be sure you are leaving completely satisfied with your care experience.

## 2024-04-25 NOTE — NURSING NOTE
"Chief Complaint   Patient presents with    Consult     Vasectomy consult       Blood pressure 116/78, pulse (!) 49, height 1.778 m (5' 10\"), weight 87.1 kg (192 lb), SpO2 97%. Body mass index is 27.55 kg/m .    Patient Active Problem List   Diagnosis    CARDIOVASCULAR SCREENING; LDL GOAL LESS THAN 160    Compression fracture of thoracic vertebra with routine healing, unspecified thoracic vertebral level, subsequent encounter    Periscapular pain    Bilateral thoracic back pain       No Known Allergies    Current Outpatient Medications   Medication Sig Dispense Refill    albuterol (PROAIR HFA/PROVENTIL HFA/VENTOLIN HFA) 108 (90 Base) MCG/ACT inhaler Inhale 2 puffs into the lungs every 6 hours as needed for shortness of breath / dyspnea or wheezing 6.7 g 0    Vitamin D, Cholecalciferol, 25 MCG (1000 UT) CAPS  (Patient not taking: Reported on 10/26/2023)         Social History     Tobacco Use    Smoking status: Never    Smokeless tobacco: Never   Vaping Use    Vaping status: Never Used   Substance Use Topics    Alcohol use: Yes     Comment: social    Drug use: No       Liban Galvan  4/25/2024  10:22 AM     "

## 2024-04-25 NOTE — PROGRESS NOTES
"CC: Desires sterilization, consult for vasectomy from Dr. Cody Stephens     HPI: Meño Daniels is a 43 year old male with 2 children, and he is intersted in getting a vasectomy for sterilization.      No history of  trauma or scrotal surgery.  No history of bleeding problems.    MA/ [facility]? No      PMH:   Past Medical History:   Diagnosis Date     Asthma      Diarrhea      Plantar fasciitis      Rectal bleeding      Past Surgical History:   Procedure Laterality Date     COLONOSCOPY N/A 7/9/2019    Procedure: Colonoscopy with random biopsies;  Surgeon: Stephan Hackett MD;  Location: [facility] OR     wisdom teeth           FAMILY HX:   Family History   Problem Relation Age of Onset     Colon Cancer Paternal Grandfather      Cerebrovascular Disease Maternal Grandmother      Cardiovascular Maternal Grandfather         open heart surgery       No history of coagulopathies none    ALLERGIES:    No Known Allergies    MEDS:   Current Outpatient Medications   Medication Sig Dispense Refill     albuterol (PROAIR HFA/PROVENTIL HFA/VENTOLIN HFA) 108 (90 Base) MCG/ACT inhaler Inhale 2 puffs into the lungs every 6 hours as needed for shortness of breath / dyspnea or wheezing 6.7 g 0     Vitamin D, Cholecalciferol, 25 MCG (1000 UT) CAPS  (Patient not taking: Reported on 10/26/2023)       No current facility-administered medications for this visit.       SOCIAL HISTORY:  Social History     Tobacco Use     Smoking status: Never     Smokeless tobacco: Never   Vaping Use     Vaping status: Never Used   Substance Use Topics     Alcohol use: Yes     Comment: social     Drug use: No      GENERAL PHYSICAL EXAM:   /78   Pulse (!) 49   Ht 1.778 m (5' 10\")   Wt 87.1 kg (192 lb)   SpO2 97%   BMI 27.55 kg/m     Constitutional: No acute distress. Well nourished.   PSYCH: Normal mood and affect.   NEURO: Normal gait, no focal deficits.   EYES: Anicteric, EOMI, PERR  CARDIOPULMONARY: Breathing non-labored, pulse regular, no " peripheral edema.   GI: Abdomen soft, nondistended   MUSCULOSKELETAL: Normal limb proportions, no muscle wasting, no contractures.    SKIN: Normal virilized hair distribution, no lesions, warts or rashes over genitalia, abdomen extremities or face.   HEME/LYMPH: No echymosis, no lymphadenopathy in groin, no lymphedema.     EXAM:  Phallus normal   Testes descended, consistency is normal.   There is a BB-sized cyst on the tunica albuginea of the left testis.  Maybe cyst?  States this has been present for years. Never has had imaging.  Epididymes present.  Vas present bilateraly, Both are very easy to find.  MARTINEZ deferred.        I discussed with him at length the risks and benefits of the procedure. He understands that this is a sterilization procedure, and not reversible contraception. He understands that reversals, while possible, are not guaranteed to work and fairly complex. I discussed with him the option of sperm cryopreservation.     I stressed that he continues to be fertile in the post-operative period, and that he should continue using other contraceptive methods, such as a condom, until he obtains a semen analysis and we review the results to confirm success of the procedure and infertility. I also stressed to him that recanalization and pregnancy can possibly occur (approx 1/2000 chance), even after we clear him with a semen analysis showing no motile sperm. I counseled him on the cyndy-operative risks of bleeding, infection, pain.  I described to him post-vasectomy pain syndrome that can occur in about 1 to 2% of men undergoing vasectomy. Usually this improves with time but in very rare cases can be persisting.    We also discussed recovery times (typically days if no complications) and post-operative care including use of ice packs, pain medication and wound care.      Plan:  - Schedule vasectomy procedure in clinic.   - scrotal ultrasound -evaluate small BB-sized lump on the left testicle, (likely  tunica albuginea cyst)      Additional Coding Information:    Problems:  one acute uncomplicated illness or injury    Data Reviewed  Minimal or none    Scrotal ultrasound ordered.    Level of risk:   low risk (e.g., OTC medication or observation, minor surgery without risks)    Time spent:  13 minutes spent on the date of the encounter doing chart review, history and exam, documentation and further activities as noted above.

## 2024-04-25 NOTE — LETTER
4/25/2024       RE: Meño Daniels  [address]  [facility] [address]     Dear Colleague,    Thank you for referring your patient, Meño Daniels, to the [facility] [place] [facility]. Please see a copy of my visit note below.    CC: Desires sterilization, consult for vasectomy from Dr. Cody Stephens     HPI: Meño Daniels is a 43 year old male with 2 children, and he is intersted in getting a vasectomy for sterilization.      No history of  trauma or scrotal surgery.  No history of bleeding problems.    MA/ [facility]? No      PMH:   Past Medical History:   Diagnosis Date    Asthma     Diarrhea     Plantar fasciitis     Rectal bleeding      Past Surgical History:   Procedure Laterality Date    COLONOSCOPY N/A 7/9/2019    Procedure: Colonoscopy with random biopsies;  Surgeon: Stephan Hackett MD;  Location: [facility] OR    wisdom teeth           FAMILY HX:   Family History   Problem Relation Age of Onset    Colon Cancer Paternal Grandfather     Cerebrovascular Disease Maternal Grandmother     Cardiovascular Maternal Grandfather         open heart surgery       No history of coagulopathies none    ALLERGIES:    No Known Allergies    MEDS:   Current Outpatient Medications   Medication Sig Dispense Refill    albuterol (PROAIR HFA/PROVENTIL HFA/VENTOLIN HFA) 108 (90 Base) MCG/ACT inhaler Inhale 2 puffs into the lungs every 6 hours as needed for shortness of breath / dyspnea or wheezing 6.7 g 0    Vitamin D, Cholecalciferol, 25 MCG (1000 UT) CAPS  (Patient not taking: Reported on 10/26/2023)       No current facility-administered medications for this visit.       SOCIAL HISTORY:  Social History     Tobacco Use    Smoking status: Never    Smokeless tobacco: Never   Vaping Use    Vaping status: Never Used   Substance Use Topics    Alcohol use: Yes     Comment: social    Drug use: No      GENERAL PHYSICAL EXAM:   /78   Pulse  "(!) 49   Ht 1.778 m (5' 10\")   Wt 87.1 kg (192 lb)   SpO2 97%   BMI 27.55 kg/m     Constitutional: No acute distress. Well nourished.   PSYCH: Normal mood and affect.   NEURO: Normal gait, no focal deficits.   EYES: Anicteric, EOMI, PERR  CARDIOPULMONARY: Breathing non-labored, pulse regular, no peripheral edema.   GI: Abdomen soft, nondistended   MUSCULOSKELETAL: Normal limb proportions, no muscle wasting, no contractures.    SKIN: Normal virilized hair distribution, no lesions, warts or rashes over genitalia, abdomen extremities or face.   HEME/LYMPH: No echymosis, no lymphadenopathy in groin, no lymphedema.     EXAM:  Phallus normal   Testes descended, consistency is normal.   There is a BB-sized cyst on the tunica albuginea of the left testis.  Maybe cyst?  States this has been present for years. Never has had imaging.  Epididymes present.  Vas present bilateraly, Both are very easy to find.  MARTINEZ deferred.        I discussed with him at length the risks and benefits of the procedure. He understands that this is a sterilization procedure, and not reversible contraception. He understands that reversals, while possible, are not guaranteed to work and fairly complex. I discussed with him the option of sperm cryopreservation.     I stressed that he continues to be fertile in the post-operative period, and that he should continue using other contraceptive methods, such as a condom, until he obtains a semen analysis and we review the results to confirm success of the procedure and infertility. I also stressed to him that recanalization and pregnancy can possibly occur (approx 1/2000 chance), even after we clear him with a semen analysis showing no motile sperm. I counseled him on the cyndy-operative risks of bleeding, infection, pain.  I described to him post-vasectomy pain syndrome that can occur in about 1 to 2% of men undergoing vasectomy. Usually this improves with time but in very rare cases can be " persisting.    We also discussed recovery times (typically days if no complications) and post-operative care including use of ice packs, pain medication and wound care.      Plan:  - Schedule vasectomy procedure in clinic.   - scrotal ultrasound -evaluate small BB-sized lump on the left testicle, (likely tunica albuginea cyst)      Additional Coding Information:    Problems:  one acute uncomplicated illness or injury    Data Reviewed  Minimal or none    Scrotal ultrasound ordered.    Level of risk:   low risk (e.g., OTC medication or observation, minor surgery without risks)    Time spent:  13 minutes spent on the date of the encounter doing chart review, history and exam, documentation and further activities as noted above.       Graeme Pearce MD

## 2024-05-15 ENCOUNTER — MYC MEDICAL ADVICE (OUTPATIENT)
Dept: UROLOGY | Facility: CLINIC | Age: 44
End: 2024-05-15
Payer: COMMERCIAL

## 2024-06-23 ENCOUNTER — HEALTH MAINTENANCE LETTER (OUTPATIENT)
Age: 44
End: 2024-06-23

## 2025-01-02 PROBLEM — M54.6 BILATERAL THORACIC BACK PAIN: Status: RESOLVED | Noted: 2023-11-24 | Resolved: 2025-01-02

## 2025-01-17 ENCOUNTER — THERAPY VISIT (OUTPATIENT)
Dept: PHYSICAL THERAPY | Facility: CLINIC | Age: 45
End: 2025-01-17
Payer: COMMERCIAL

## 2025-01-17 DIAGNOSIS — M25.572 ANKLE PAIN, LEFT: Primary | ICD-10-CM

## 2025-01-17 PROCEDURE — 97161 PT EVAL LOW COMPLEX 20 MIN: CPT | Mod: GP | Performed by: PHYSICAL THERAPIST

## 2025-01-17 PROCEDURE — 97110 THERAPEUTIC EXERCISES: CPT | Mod: GP | Performed by: PHYSICAL THERAPIST

## 2025-01-17 PROCEDURE — 97530 THERAPEUTIC ACTIVITIES: CPT | Mod: GP | Performed by: PHYSICAL THERAPIST

## 2025-01-17 ASSESSMENT — ACTIVITIES OF DAILY LIVING (ADL)
LEFS_SCORE(%): INCOMPLETE
LEFS_RAW_SCORE: INCOMPLETE
PLEASE_INDICATE_YOR_PRIMARY_REASON_FOR_REFERRAL_TO_THERAPY:: FOOT AND/OR ANKLE
ANY_OF_YOUR_USUAL_WORK,_HOUSEWORK_OR_SCHOOL_ACTIVITIES: A LITTLE BIT OF DIFFICULTY

## 2025-01-17 NOTE — PROGRESS NOTES
PHYSICAL THERAPY EVALUATION  Type of Visit: Evaluation       Fall Risk Screen:  Fall screen completed by: PT  Have you fallen 2 or more times in the past year?: No  Have you fallen and had an injury in the past year?: Yes  Is patient a fall risk?: No    Subjective   KEY PT FINDINGS:  1) Guarded with movements in weight bearing  2) Limited ankle DF in OKC and CKC  3) No pain with OKC ROM assessment.     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   Did not initially seek treatment, was noticing improvement in symptoms until later November when he hit his heel hard going down the stairs.   Went to MD, MRI showed chondral leison on lateral talar dome. Boot x 6 weeks. Now PT + ankle brace.      Symptom Behavior Details    Primary Symptoms Sporadic symptoms; Activity/position dependent, pain (Location: lateral ankle, anterior-lateral more than straight lateral., Quality: Sharp initially when he would step wrong and Aching/Throbbing), stiffness, weakness, swelling along talar-fibular joint.    Worst Pain 2-3/10 (with most recently)   Symptom Provocators Stepping weight, has not pushed his activity.   Best Pain 0/10    Symptom Relievers Activity modification   Time of day dependent? No re: pain and Stiffness in morning   Recent symptom change? symptoms improving     Prior Testing/Intervention for current condition:  Prior Tests  x-ray and MRI   Prior Treatment none     Lifestyle & General Medical History:  Employment Owns his own business   Usual physical activities  (within past year) Biking, mountain biking, running, weight training (has only been doing upper body for now).    Orthopaedic history See Epic Chart   Notable medical history See Epic Chart    Patient Reported Health good           Presenting condition or subjective complaint: osteochondral lesion recovery  Date of onset: 01/07/25    Relevant medical history:     Dates & types of surgery:      Prior diagnostic imaging/testing results: MRI; X-ray     Prior therapy history for the same diagnosis, illness or injury: No      Living Environment  Social support: With a significant other or spouse   Type of home: House   Stairs to enter the home:         Ramp: No   Stairs inside the home: Yes 26 Is there a railing: Yes     Help at home: None  Equipment owned: Crutches     Employment: Not Applicable    Hobbies/Interests: xc ski, bike, run, workout    Patient goals for therapy: xc ski, bike, run       Objective   Foot/Ankle Physical Therapy Examination    Dynamic Movement Screen:  2 leg stance: equal weight bearing, mild decrease in arch height bilaterally.   2 leg squat: not assessed.     1 leg stance: Right: Mild proprio deficit; Left: Not assessed    Gait: Without the brace, mild shortened step length.     Range of motion:      Left (degrees) Right (degrees)   Knee Straight 9 14   Knee Bent 5cm 13.5cm   Toes Extended/Knee Bent NT NT   Goniometer     Plantarflexion 26 56   Inversion 50 50   Eversion 10 15   Great Toe Extension (OKC) NT NT     Palpation:     Tender to palpation at the following structures: Anterior lateral TCJ line   NOT tender to palpation at the following structures: achilles, plantar structures      Resisted Testing:     Right Left   Plantarflexion - -   Dorsiflexion - -   Eversion - -   Inversion - -   Great Toe Extension - -   Great Toe Flexion - -     Foot Screen:   rearfoot hypomobility and restricted ankle DF noted     Assessment & Plan   CLINICAL IMPRESSIONS  Medical Diagnosis: Left ankle Talar dome chondral lesion    Treatment Diagnosis: Left ankle pain, stiffness   Impression/Assessment: Patient is a 44 year old adult with left ankle complaints.  The following significant findings  have been identified: Pain, Decreased ROM/flexibility, Decreased joint mobility, Decreased strength, Impaired balance, Decreased proprioception, Edema, Impaired gait, Impaired muscle performance, and Decreased activity tolerance. These impairments interfere with their ability to perform self care tasks, work tasks, recreational activities, household chores, household mobility, and community mobility as compared to previous level of function.     Clinical Decision Making (Complexity):  Clinical Presentation: Stable/Uncomplicated  Clinical Presentation Rationale: based on medical and personal factors listed in PT evaluation  Clinical Decision Making (Complexity): Low complexity    PLAN OF CARE  Treatment Interventions:  Modalities: Cryotherapy, Dry Needling  Interventions: Gait Training, Manual Therapy, Neuromuscular Re-education, Therapeutic Activity, Therapeutic Exercise    Long Term Goals     PT Goal 1  Goal Identifier: Walking  Goal Description: Patient to ambualte without brace and without pain for 30 minutes  Rationale: to maximize safety and independence within the community  Target Date: 04/07/25      Frequency of Treatment: 1x/week  Duration of Treatment: 3 weeks, 2x/month x 2 months    Recommended Referrals to Other Professionals: Physical Therapy  Education Assessment:   Learner/Method: Patient;No Barriers to Learning    Risks and benefits of evaluation/treatment have been explained.   Patient/Family/caregiver agrees with Plan of Care.     Evaluation Time:     PT Eval, Low Complexity Minutes (72867): 16     Signing Clinician: Jeanie Patricia PT

## 2025-01-20 PROBLEM — M25.572 ANKLE PAIN, LEFT: Status: ACTIVE | Noted: 2025-01-20

## 2025-01-24 ENCOUNTER — THERAPY VISIT (OUTPATIENT)
Dept: PHYSICAL THERAPY | Facility: CLINIC | Age: 45
End: 2025-01-24
Payer: COMMERCIAL

## 2025-01-24 DIAGNOSIS — M25.572 ANKLE PAIN, LEFT: Primary | ICD-10-CM

## 2025-01-24 PROCEDURE — 97530 THERAPEUTIC ACTIVITIES: CPT | Mod: GP | Performed by: PHYSICAL THERAPIST

## 2025-01-24 PROCEDURE — 97110 THERAPEUTIC EXERCISES: CPT | Mod: GP | Performed by: PHYSICAL THERAPIST

## 2025-01-29 ENCOUNTER — THERAPY VISIT (OUTPATIENT)
Dept: PHYSICAL THERAPY | Facility: CLINIC | Age: 45
End: 2025-01-29
Payer: COMMERCIAL

## 2025-01-29 DIAGNOSIS — M25.572 ANKLE PAIN, LEFT: Primary | ICD-10-CM

## 2025-01-29 PROCEDURE — 97110 THERAPEUTIC EXERCISES: CPT | Mod: GP | Performed by: PHYSICAL THERAPIST

## 2025-02-03 ENCOUNTER — TELEPHONE (OUTPATIENT)
Dept: PHYSICAL THERAPY | Facility: CLINIC | Age: 45
End: 2025-02-03
Payer: COMMERCIAL

## 2025-02-03 DIAGNOSIS — M25.572 ANKLE PAIN, LEFT: Primary | ICD-10-CM

## 2025-02-03 NOTE — TELEPHONE ENCOUNTER
Patient called regarding new symptoms over the weekend.     Noticing when in brace and crocs it feels like his ankle needs to pop. Has done his time out of the brace in a tennis shoe, it feels really good.     He is concerned about the symptom change where the crocs felt really good initially but now they don't.     Assured him that his recovery is going to have ups and downs.   As long as there is no sharp pain, as long as the pain is intermittent, it is ok to continue on.     Do not suspect that he is over doing it.     Continue with weaning out of brace. Continue with plan as set at last visit.

## 2025-03-05 ENCOUNTER — THERAPY VISIT (OUTPATIENT)
Dept: PHYSICAL THERAPY | Facility: CLINIC | Age: 45
End: 2025-03-05
Payer: COMMERCIAL

## 2025-03-05 DIAGNOSIS — M25.572 ANKLE PAIN, LEFT: Primary | ICD-10-CM

## 2025-03-05 PROCEDURE — 97110 THERAPEUTIC EXERCISES: CPT | Mod: GP | Performed by: PHYSICAL THERAPIST

## 2025-03-05 PROCEDURE — 97140 MANUAL THERAPY 1/> REGIONS: CPT | Mod: GP | Performed by: PHYSICAL THERAPIST

## 2025-04-07 ENCOUNTER — THERAPY VISIT (OUTPATIENT)
Dept: PHYSICAL THERAPY | Facility: CLINIC | Age: 45
End: 2025-04-07
Payer: COMMERCIAL

## 2025-04-07 DIAGNOSIS — M25.572 ANKLE PAIN, LEFT: Primary | ICD-10-CM

## 2025-04-07 PROCEDURE — 97110 THERAPEUTIC EXERCISES: CPT | Mod: GP | Performed by: PHYSICAL THERAPIST

## 2025-07-12 ENCOUNTER — HEALTH MAINTENANCE LETTER (OUTPATIENT)
Age: 45
End: 2025-07-12

## (undated) DEVICE — WIPE PREMOIST CLEANSING WASHCLOTHS 7988

## (undated) DEVICE — ENDO TUBING CO2 SMARTCAP STERILE DISP 100145CO2EXT

## (undated) DEVICE — GOWN ISOLATION YELLOW XL NOT STERILE 3111PG

## (undated) DEVICE — KIT CONNECTOR FOR OLYMPUS ENDOSCOPES DEFENDO 100310

## (undated) DEVICE — SOL WATER IRRIG 1000ML BOTTLE 2F7114

## (undated) DEVICE — PAD CHUX UNDERPAD 30X30"

## (undated) DEVICE — ENDO CAP AND TUBING STERILE FOR ENDOGATOR  100130

## (undated) DEVICE — SUCTION MANIFOLD NEPTUNE 2 SYS 1 PORT 702-025-000

## (undated) DEVICE — TUBING SUCTION 12"X1/4" N612

## (undated) DEVICE — JELLY LUBRICATING SURGILUBE 2OZ TUBE

## (undated) RX ORDER — LIDOCAINE HYDROCHLORIDE 20 MG/ML
INJECTION, SOLUTION EPIDURAL; INFILTRATION; INTRACAUDAL; PERINEURAL
Status: DISPENSED
Start: 2019-07-09

## (undated) RX ORDER — PROPOFOL 10 MG/ML
INJECTION, EMULSION INTRAVENOUS
Status: DISPENSED
Start: 2019-07-09

## (undated) RX ORDER — ACETAMINOPHEN 325 MG/1
TABLET ORAL
Status: DISPENSED
Start: 2019-07-09

## (undated) RX ORDER — ONDANSETRON 2 MG/ML
INJECTION INTRAMUSCULAR; INTRAVENOUS
Status: DISPENSED
Start: 2019-07-09

## (undated) RX ORDER — GABAPENTIN 300 MG/1
CAPSULE ORAL
Status: DISPENSED
Start: 2019-07-09